# Patient Record
Sex: FEMALE | Race: OTHER | Employment: UNEMPLOYED | URBAN - METROPOLITAN AREA
[De-identification: names, ages, dates, MRNs, and addresses within clinical notes are randomized per-mention and may not be internally consistent; named-entity substitution may affect disease eponyms.]

---

## 2018-05-30 ENCOUNTER — APPOINTMENT (OUTPATIENT)
Dept: CT IMAGING | Age: 33
End: 2018-05-30

## 2018-05-30 ENCOUNTER — HOSPITAL ENCOUNTER (EMERGENCY)
Age: 33
Discharge: HOME | End: 2018-05-31
Attending: EMERGENCY MEDICINE

## 2018-05-30 ENCOUNTER — APPOINTMENT (OUTPATIENT)
Dept: GENERAL RADIOLOGY | Age: 33
End: 2018-05-30

## 2018-05-30 DIAGNOSIS — H81.12 BENIGN PAROXYSMAL POSITIONAL VERTIGO OF LEFT EAR: ICD-10-CM

## 2018-05-30 DIAGNOSIS — R20.0 NUMBNESS: ICD-10-CM

## 2018-05-30 DIAGNOSIS — R42 DIZZINESS: Primary | ICD-10-CM

## 2018-05-30 LAB
ABSOLUTE EOS #: 0.1 K/UL (ref 0–0.4)
ABSOLUTE IMMATURE GRANULOCYTE: ABNORMAL K/UL (ref 0–0.3)
ABSOLUTE LYMPH #: 3.7 K/UL (ref 1–4.8)
ABSOLUTE MONO #: 1 K/UL (ref 0.1–1.2)
ANION GAP SERPL CALCULATED.3IONS-SCNC: 13 MMOL/L (ref 9–17)
BASOPHILS # BLD: 1 % (ref 0–2)
BASOPHILS ABSOLUTE: 0.1 K/UL (ref 0–0.2)
BUN BLDV-MCNC: 10 MG/DL (ref 6–20)
BUN/CREAT BLD: NORMAL (ref 9–20)
CALCIUM SERPL-MCNC: 9.4 MG/DL (ref 8.6–10.4)
CHLORIDE BLD-SCNC: 103 MMOL/L (ref 98–107)
CO2: 25 MMOL/L (ref 20–31)
CREAT SERPL-MCNC: 0.6 MG/DL (ref 0.5–0.9)
DIFFERENTIAL TYPE: ABNORMAL
EKG ATRIAL RATE: 78 BPM
EKG P AXIS: 67 DEGREES
EKG P-R INTERVAL: 154 MS
EKG Q-T INTERVAL: 360 MS
EKG QRS DURATION: 76 MS
EKG QTC CALCULATION (BAZETT): 410 MS
EKG R AXIS: 77 DEGREES
EKG T AXIS: 38 DEGREES
EKG VENTRICULAR RATE: 78 BPM
EOSINOPHILS RELATIVE PERCENT: 1 % (ref 1–4)
GFR AFRICAN AMERICAN: >60 ML/MIN
GFR NON-AFRICAN AMERICAN: >60 ML/MIN
GFR SERPL CREATININE-BSD FRML MDRD: NORMAL ML/MIN/{1.73_M2}
GFR SERPL CREATININE-BSD FRML MDRD: NORMAL ML/MIN/{1.73_M2}
GLUCOSE BLD-MCNC: 94 MG/DL (ref 70–99)
HCT VFR BLD CALC: 37.3 % (ref 36–46)
HEMOGLOBIN: 12.8 G/DL (ref 12–16)
IMMATURE GRANULOCYTES: ABNORMAL %
LYMPHOCYTES # BLD: 26 % (ref 24–44)
MCH RBC QN AUTO: 31.2 PG (ref 26–34)
MCHC RBC AUTO-ENTMCNC: 34.4 G/DL (ref 31–37)
MCV RBC AUTO: 90.8 FL (ref 80–100)
MONOCYTES # BLD: 7 % (ref 2–11)
NRBC AUTOMATED: ABNORMAL PER 100 WBC
PDW BLD-RTO: 12.5 % (ref 12.5–15.4)
PLATELET # BLD: 217 K/UL (ref 140–450)
PLATELET ESTIMATE: ABNORMAL
PMV BLD AUTO: 9.2 FL (ref 6–12)
POTASSIUM SERPL-SCNC: 3.8 MMOL/L (ref 3.7–5.3)
RBC # BLD: 4.11 M/UL (ref 4–5.2)
RBC # BLD: ABNORMAL 10*6/UL
SEG NEUTROPHILS: 65 % (ref 36–66)
SEGMENTED NEUTROPHILS ABSOLUTE COUNT: 9.5 K/UL (ref 1.8–7.7)
SODIUM BLD-SCNC: 141 MMOL/L (ref 135–144)
TROPONIN INTERP: NORMAL
TROPONIN T: <0.03 NG/ML
WBC # BLD: 14.4 K/UL (ref 3.5–11)
WBC # BLD: ABNORMAL 10*3/UL

## 2018-05-30 PROCEDURE — 93005 ELECTROCARDIOGRAM TRACING: CPT

## 2018-05-30 PROCEDURE — 85025 COMPLETE CBC W/AUTO DIFF WBC: CPT

## 2018-05-30 PROCEDURE — 70450 CT HEAD/BRAIN W/O DYE: CPT

## 2018-05-30 PROCEDURE — 6370000000 HC RX 637 (ALT 250 FOR IP): Performed by: EMERGENCY MEDICINE

## 2018-05-30 PROCEDURE — 84484 ASSAY OF TROPONIN QUANT: CPT

## 2018-05-30 PROCEDURE — 99284 EMERGENCY DEPT VISIT MOD MDM: CPT

## 2018-05-30 PROCEDURE — 71045 X-RAY EXAM CHEST 1 VIEW: CPT

## 2018-05-30 PROCEDURE — 36415 COLL VENOUS BLD VENIPUNCTURE: CPT

## 2018-05-30 PROCEDURE — 80048 BASIC METABOLIC PNL TOTAL CA: CPT

## 2018-05-30 RX ORDER — MECLIZINE HCL 12.5 MG/1
25 TABLET ORAL ONCE
Status: COMPLETED | OUTPATIENT
Start: 2018-05-30 | End: 2018-05-30

## 2018-05-30 RX ADMIN — MECLIZINE HCL 25 MG: 12.5 TABLET ORAL at 23:35

## 2018-05-30 ASSESSMENT — PAIN SCALES - GENERAL: PAINLEVEL_OUTOF10: 2

## 2018-05-30 ASSESSMENT — PAIN DESCRIPTION - DESCRIPTORS: DESCRIPTORS: HEADACHE

## 2018-05-30 ASSESSMENT — PAIN DESCRIPTION - PAIN TYPE: TYPE: ACUTE PAIN

## 2018-05-30 ASSESSMENT — PAIN DESCRIPTION - LOCATION: LOCATION: HEAD

## 2018-05-31 VITALS
TEMPERATURE: 98.2 F | DIASTOLIC BLOOD PRESSURE: 63 MMHG | BODY MASS INDEX: 23 KG/M2 | OXYGEN SATURATION: 97 % | HEART RATE: 85 BPM | HEIGHT: 62 IN | RESPIRATION RATE: 18 BRPM | WEIGHT: 125 LBS | SYSTOLIC BLOOD PRESSURE: 109 MMHG

## 2018-05-31 RX ORDER — MECLIZINE HYDROCHLORIDE 25 MG/1
25 TABLET ORAL 3 TIMES DAILY PRN
Qty: 30 TABLET | Refills: 0 | Status: SHIPPED | OUTPATIENT
Start: 2018-05-31 | End: 2018-06-10

## 2018-09-03 ENCOUNTER — HOSPITAL ENCOUNTER (EMERGENCY)
Age: 33
Discharge: HOME | End: 2018-09-03
Attending: SPECIALIST
Payer: MEDICAID

## 2018-09-03 VITALS
WEIGHT: 145 LBS | BODY MASS INDEX: 25.69 KG/M2 | TEMPERATURE: 98.6 F | SYSTOLIC BLOOD PRESSURE: 110 MMHG | HEIGHT: 63 IN | DIASTOLIC BLOOD PRESSURE: 68 MMHG | HEART RATE: 71 BPM | OXYGEN SATURATION: 98 %

## 2018-09-03 DIAGNOSIS — J30.2 SEASONAL ALLERGIC RHINITIS, UNSPECIFIED TRIGGER: Primary | ICD-10-CM

## 2018-09-03 PROCEDURE — 6360000002 HC RX W HCPCS: Performed by: SPECIALIST

## 2018-09-03 PROCEDURE — 96374 THER/PROPH/DIAG INJ IV PUSH: CPT

## 2018-09-03 PROCEDURE — 99282 EMERGENCY DEPT VISIT SF MDM: CPT

## 2018-09-03 RX ORDER — PREDNISONE 20 MG/1
20 TABLET ORAL 2 TIMES DAILY
Qty: 10 TABLET | Refills: 0 | Status: SHIPPED | OUTPATIENT
Start: 2018-09-03 | End: 2018-09-08

## 2018-09-03 RX ORDER — METHYLPREDNISOLONE SODIUM SUCCINATE 125 MG/2ML
125 INJECTION, POWDER, LYOPHILIZED, FOR SOLUTION INTRAMUSCULAR; INTRAVENOUS ONCE
Status: COMPLETED | OUTPATIENT
Start: 2018-09-03 | End: 2018-09-03

## 2018-09-03 RX ADMIN — METHYLPREDNISOLONE SODIUM SUCCINATE 125 MG: 125 INJECTION, POWDER, FOR SOLUTION INTRAMUSCULAR; INTRAVENOUS at 19:59

## 2018-09-03 ASSESSMENT — ENCOUNTER SYMPTOMS
SORE THROAT: 0
RHINORRHEA: 1
ABDOMINAL PAIN: 0
WHEEZING: 0
VOMITING: 0
SINUS PAIN: 0
DIARRHEA: 0
COUGH: 0
TROUBLE SWALLOWING: 0
SHORTNESS OF BREATH: 0

## 2018-09-26 ENCOUNTER — APPOINTMENT (OUTPATIENT)
Dept: GENERAL RADIOLOGY | Age: 33
End: 2018-09-26
Payer: MEDICAID

## 2018-09-26 ENCOUNTER — HOSPITAL ENCOUNTER (EMERGENCY)
Age: 33
Discharge: HOME | End: 2018-09-26
Attending: EMERGENCY MEDICINE
Payer: MEDICAID

## 2018-09-26 VITALS
HEART RATE: 93 BPM | DIASTOLIC BLOOD PRESSURE: 69 MMHG | TEMPERATURE: 98.7 F | RESPIRATION RATE: 16 BRPM | OXYGEN SATURATION: 98 % | SYSTOLIC BLOOD PRESSURE: 101 MMHG | BODY MASS INDEX: 24.98 KG/M2 | WEIGHT: 141 LBS | HEIGHT: 63 IN

## 2018-09-26 DIAGNOSIS — J18.9 PNEUMONIA DUE TO ORGANISM: Primary | ICD-10-CM

## 2018-09-26 PROCEDURE — 99283 EMERGENCY DEPT VISIT LOW MDM: CPT

## 2018-09-26 PROCEDURE — 71046 X-RAY EXAM CHEST 2 VIEWS: CPT

## 2018-09-26 RX ORDER — GUAIFENESIN 600 MG/1
600 TABLET, EXTENDED RELEASE ORAL 2 TIMES DAILY
Qty: 20 TABLET | Refills: 0 | Status: SHIPPED | OUTPATIENT
Start: 2018-09-26 | End: 2018-10-08 | Stop reason: ALTCHOICE

## 2018-09-26 RX ORDER — BENZONATATE 100 MG/1
100 CAPSULE ORAL 3 TIMES DAILY PRN
Qty: 30 CAPSULE | Refills: 0 | Status: SHIPPED | OUTPATIENT
Start: 2018-09-26 | End: 2018-10-03

## 2018-09-26 RX ORDER — AZITHROMYCIN 250 MG/1
TABLET, FILM COATED ORAL
Qty: 6 TABLET | Refills: 0 | Status: SHIPPED | OUTPATIENT
Start: 2018-09-26 | End: 2018-10-08 | Stop reason: ALTCHOICE

## 2018-09-26 ASSESSMENT — PAIN SCALES - GENERAL: PAINLEVEL_OUTOF10: 8

## 2018-09-26 ASSESSMENT — PAIN DESCRIPTION - FREQUENCY: FREQUENCY: CONTINUOUS

## 2018-09-26 ASSESSMENT — PAIN DESCRIPTION - PAIN TYPE: TYPE: ACUTE PAIN

## 2018-09-26 ASSESSMENT — PAIN DESCRIPTION - LOCATION: LOCATION: GENERALIZED

## 2018-09-26 ASSESSMENT — PAIN DESCRIPTION - DESCRIPTORS: DESCRIPTORS: ACHING

## 2018-09-26 NOTE — ED PROVIDER NOTES
[address]  eMERGENCY dEPARTMENT eNCOUnter      Pt Name: Brian Oh  MRN: [id number]  [place] 1985  Date of evaluation: 9/26/2018      CHIEF COMPLAINT       Chief Complaint   Patient presents with    Cough    Generalized Body Aches    Chills         HISTORY OF PRESENT ILLNESS      The patient presents to the emergency department with chills, cough, and body aches. She also has had runny nose and sore throat. She says this is the fifth day of her illness. She does not have a history of breathing problems but has been coughing up a yellow sputum. She has had a decreased appetite but no vomiting or diarrhea. She has not had a rash. She's been taking TheraFlu and iburofen. REVIEW OF SYSTEMS       All systems reviewed and negative unless noted in HPI. The patient reports chills. Denies vision change. Recent sore throat and rhinorrhea. Denies any neck pain or stiffness. Productive cough without pain. No hemoptysis. No nausea,  vomiting or diarrhea. Decreased appetite. Denies any dysuria. Denies urinary frequency or hematuria. Denies musculoskeletal injury or pain. Denies any weakness, numbness or focal neurologic deficit. Denies any skin rash or edema. No recent psychiatric issues. No easy bruising or bleeding. Denies any polyuria, polydypsia or history of immunocompromise. PAST MEDICAL HISTORY    has a past medical history of Depression. SURGICAL HISTORY      has no past surgical history on file. CURRENT MEDICATIONS       Previous Medications    No medications on file       ALLERGIES     is allergic to seasonal.    FAMILY HISTORY     has no family status information on file. family history is not on file. SOCIAL HISTORY      reports that she has never smoked. She has never used smokeless tobacco. She reports that she does not drink alcohol or use drugs.     PHYSICAL EXAM     INITIAL VITALS:  height is 5' 3\" (1.6 m) and weight is

## 2018-10-08 ENCOUNTER — OFFICE VISIT (OUTPATIENT)
Dept: PRIMARY CARE CLINIC | Age: 33
End: 2018-10-08
Payer: MEDICAID

## 2018-10-08 VITALS
DIASTOLIC BLOOD PRESSURE: 62 MMHG | BODY MASS INDEX: 25.52 KG/M2 | HEIGHT: 63 IN | OXYGEN SATURATION: 96 % | SYSTOLIC BLOOD PRESSURE: 86 MMHG | HEART RATE: 88 BPM | WEIGHT: 144 LBS

## 2018-10-08 DIAGNOSIS — M54.6 ACUTE MIDLINE THORACIC BACK PAIN: Primary | ICD-10-CM

## 2018-10-08 DIAGNOSIS — Z87.891 QUIT SMOKING WITHIN PAST YEAR: ICD-10-CM

## 2018-10-08 DIAGNOSIS — J13 PNEUMONIA OF LEFT LOWER LOBE DUE TO STREPTOCOCCUS PNEUMONIAE (HCC): ICD-10-CM

## 2018-10-08 DIAGNOSIS — N39.3 STRESS INCONTINENCE, FEMALE: ICD-10-CM

## 2018-10-08 DIAGNOSIS — R63.5 WEIGHT GAIN: ICD-10-CM

## 2018-10-08 DIAGNOSIS — Z82.3 FAMILY HISTORY OF STROKE: ICD-10-CM

## 2018-10-08 DIAGNOSIS — Z11.4 SCREENING FOR HIV (HUMAN IMMUNODEFICIENCY VIRUS): ICD-10-CM

## 2018-10-08 DIAGNOSIS — R53.83 FATIGUE, UNSPECIFIED TYPE: ICD-10-CM

## 2018-10-08 PROCEDURE — 99204 OFFICE O/P NEW MOD 45 MIN: CPT | Performed by: INTERNAL MEDICINE

## 2018-10-08 RX ORDER — GUAIFENESIN/DEXTROMETHORPHAN 100-10MG/5
5 SYRUP ORAL 3 TIMES DAILY PRN
Qty: 236 ML | Refills: 0 | Status: SHIPPED | OUTPATIENT
Start: 2018-10-08 | End: 2018-10-18

## 2018-10-08 RX ORDER — ALBUTEROL SULFATE 90 UG/1
2 AEROSOL, METERED RESPIRATORY (INHALATION) EVERY 6 HOURS PRN
Qty: 1 INHALER | Refills: 0 | Status: SHIPPED | OUTPATIENT
Start: 2018-10-08 | End: 2019-04-07 | Stop reason: ALTCHOICE

## 2018-10-08 ASSESSMENT — ENCOUNTER SYMPTOMS
VOMITING: 0
DIARRHEA: 0
RHINORRHEA: 0
APNEA: 0
SINUS PRESSURE: 0
RECTAL PAIN: 0
CHEST TIGHTNESS: 1
ABDOMINAL DISTENTION: 0
SHORTNESS OF BREATH: 1
COUGH: 1
BACK PAIN: 1
NAUSEA: 0
ABDOMINAL PAIN: 0
SINUS PAIN: 0
WHEEZING: 0
SORE THROAT: 0

## 2018-10-08 ASSESSMENT — PATIENT HEALTH QUESTIONNAIRE - PHQ9
SUM OF ALL RESPONSES TO PHQ9 QUESTIONS 1 & 2: 1
SUM OF ALL RESPONSES TO PHQ QUESTIONS 1-9: 1
1. LITTLE INTEREST OR PLEASURE IN DOING THINGS: 0
2. FEELING DOWN, DEPRESSED OR HOPELESS: 1
SUM OF ALL RESPONSES TO PHQ QUESTIONS 1-9: 1

## 2018-10-08 NOTE — PROGRESS NOTES
[address] PRIMARY CARE  [address] [place] [address]   [address]  [place] [address]  Dept: [phone]  Dept Fax: [phone]    Aparna Wagner is a 26 y.o. female who presents today for her medical conditions/complaints as noted below. Chief Complaint   Patient presents with   [address] from [facility] pneumonia    Cough       HPI:     This is a relatively healthy 64-year-old female who is here to establish care. She [place] moved from [place] after her divorce. She has a 13year-old. She has not had any panic medical problems and has not taken any medications in the past.  She was recently seen in the ER for pneumonia and was treated with antibiotics. She reports that she is feeling much better now but she has this Lamenting cough which is still lingering. She does not have any fever or chills like she had before. X-ray was reviewed along with the patient. She has quit smoking 6 months ago and before that she used to smoke half a pack a day for about 10 years. She reports that she has been having urinary leaking and accidents with coughing. This has been going on since she had the pneumonia. She complains of this midline thoracic back pain which is tender on palpation which is started about a month ago and is exacerbated with movement. She is concerned and wants to get that imaged. She complains of fatigue for past couple of months and she has gained about 25 pounds in the last 6 months. This was unintentional.  She reports that she had anemia before and had to take iron supplements while she was at [place] [facility]         No results found for: LABA1C          ( goal A1C is < 7)   No results found for: LABMICR  No results found for: LDLCHOLESTEROL, LDLCALC    (goal LDL is <100)   BUN (mg/dL)   Date Value   05/30/2018 10     BP Readings from Last 3 Encounters:   10/08/18 86/62   09/26/18 101/69   09/03/18 110/68          (goal patient instructions. Discussed use, benefit, and side effects of prescribed medications. All patient questions answered. Pt voiced understanding. Reviewed health maintenance. Instructed to continue current medications, diet and exercise. Patient agreed with treatment plan. Follow up as directed. Electronically signed by Mandeep Lomeli MD on 10/8/2018 at 3:41 PM      Please note that this chart was generated using voice recognition Dragon dictation software. Although every effort was made to ensure the accuracy of this automated transcription, some errors in transcription may have occurred.

## 2018-10-09 ENCOUNTER — HOSPITAL ENCOUNTER (OUTPATIENT)
Dept: GENERAL RADIOLOGY | Age: 33
Discharge: HOME | End: 2018-10-11
Payer: MEDICAID

## 2018-10-09 ENCOUNTER — HOSPITAL ENCOUNTER (OUTPATIENT)
Age: 33
Discharge: HOME | End: 2018-10-09
Payer: MEDICAID

## 2018-10-09 ENCOUNTER — HOSPITAL ENCOUNTER (OUTPATIENT)
Age: 33
Discharge: HOME | End: 2018-10-11
Payer: MEDICAID

## 2018-10-09 DIAGNOSIS — R53.83 FATIGUE, UNSPECIFIED TYPE: ICD-10-CM

## 2018-10-09 DIAGNOSIS — R63.5 WEIGHT GAIN: ICD-10-CM

## 2018-10-09 DIAGNOSIS — M54.6 ACUTE MIDLINE THORACIC BACK PAIN: ICD-10-CM

## 2018-10-09 DIAGNOSIS — Z82.3 FAMILY HISTORY OF STROKE: ICD-10-CM

## 2018-10-09 DIAGNOSIS — Z11.4 SCREENING FOR HIV (HUMAN IMMUNODEFICIENCY VIRUS): ICD-10-CM

## 2018-10-09 LAB
ABSOLUTE EOS #: 0.17 K/UL (ref 0–0.44)
ABSOLUTE IMMATURE GRANULOCYTE: 0.03 K/UL (ref 0–0.3)
ABSOLUTE LYMPH #: 2.59 K/UL (ref 1.1–3.7)
ABSOLUTE MONO #: 0.7 K/UL (ref 0.1–1.2)
BASOPHILS # BLD: 1 % (ref 0–2)
BASOPHILS ABSOLUTE: 0.08 K/UL (ref 0–0.2)
CHOLESTEROL/HDL RATIO: 2.7
CHOLESTEROL: 135 MG/DL
DIFFERENTIAL TYPE: ABNORMAL
EOSINOPHILS RELATIVE PERCENT: 2 % (ref 1–4)
HCT VFR BLD CALC: 36.7 % (ref 36.3–47.1)
HDLC SERPL-MCNC: 50 MG/DL
HEMOGLOBIN: 12.4 G/DL (ref 11.9–15.1)
HIV AG/AB: NONREACTIVE
IMMATURE GRANULOCYTES: 0 %
LDL CHOLESTEROL: 74 MG/DL (ref 0–130)
LYMPHOCYTES # BLD: 30 % (ref 24–43)
MCH RBC QN AUTO: 30 PG (ref 25.2–33.5)
MCHC RBC AUTO-ENTMCNC: 33.8 G/DL (ref 28.4–34.8)
MCV RBC AUTO: 88.9 FL (ref 82.6–102.9)
MONOCYTES # BLD: 8 % (ref 3–12)
NRBC AUTOMATED: 0 PER 100 WBC
PDW BLD-RTO: 11.6 % (ref 11.8–14.4)
PLATELET # BLD: 230 K/UL (ref 138–453)
PLATELET ESTIMATE: ABNORMAL
PMV BLD AUTO: 11 FL (ref 8.1–13.5)
RBC # BLD: 4.13 M/UL (ref 3.95–5.11)
RBC # BLD: ABNORMAL 10*6/UL
SEG NEUTROPHILS: 59 % (ref 36–65)
SEGMENTED NEUTROPHILS ABSOLUTE COUNT: 5.18 K/UL (ref 1.5–8.1)
TRIGL SERPL-MCNC: 56 MG/DL
TSH SERPL DL<=0.05 MIU/L-ACNC: 2.7 MIU/L (ref 0.3–5)
VLDLC SERPL CALC-MCNC: NORMAL MG/DL (ref 1–30)
WBC # BLD: 8.8 K/UL (ref 3.5–11.3)
WBC # BLD: ABNORMAL 10*3/UL

## 2018-10-09 PROCEDURE — 87389 HIV-1 AG W/HIV-1&-2 AB AG IA: CPT

## 2018-10-09 PROCEDURE — 80061 LIPID PANEL: CPT

## 2018-10-09 PROCEDURE — 85025 COMPLETE CBC W/AUTO DIFF WBC: CPT

## 2018-10-09 PROCEDURE — 72070 X-RAY EXAM THORAC SPINE 2VWS: CPT

## 2018-10-09 PROCEDURE — 36415 COLL VENOUS BLD VENIPUNCTURE: CPT

## 2018-10-09 PROCEDURE — 84443 ASSAY THYROID STIM HORMONE: CPT

## 2018-10-22 ENCOUNTER — HOSPITAL ENCOUNTER (OUTPATIENT)
Age: 33
Setting detail: SPECIMEN
Discharge: HOME | End: 2018-10-22
Payer: MEDICAID

## 2018-10-22 ENCOUNTER — OFFICE VISIT (OUTPATIENT)
Dept: PRIMARY CARE CLINIC | Age: 33
End: 2018-10-22
Payer: MEDICAID

## 2018-10-22 VITALS
RESPIRATION RATE: 12 BRPM | DIASTOLIC BLOOD PRESSURE: 68 MMHG | OXYGEN SATURATION: 97 % | BODY MASS INDEX: 25.09 KG/M2 | SYSTOLIC BLOOD PRESSURE: 110 MMHG | HEIGHT: 63 IN | HEART RATE: 93 BPM | WEIGHT: 141.6 LBS

## 2018-10-22 DIAGNOSIS — Z23 NEED FOR VACCINATION: ICD-10-CM

## 2018-10-22 DIAGNOSIS — Z01.419 WELL WOMAN EXAM: Primary | ICD-10-CM

## 2018-10-22 PROCEDURE — 90715 TDAP VACCINE 7 YRS/> IM: CPT | Performed by: INTERNAL MEDICINE

## 2018-10-22 PROCEDURE — 90471 IMMUNIZATION ADMIN: CPT | Performed by: INTERNAL MEDICINE

## 2018-10-22 ASSESSMENT — ENCOUNTER SYMPTOMS
ABDOMINAL DISTENTION: 0
APNEA: 0
SHORTNESS OF BREATH: 0
SINUS PAIN: 0
WHEEZING: 0
COUGH: 1
SINUS PRESSURE: 0

## 2018-11-06 LAB — CYTOLOGY REPORT: NORMAL

## 2019-01-09 ENCOUNTER — OFFICE VISIT (OUTPATIENT)
Dept: PRIMARY CARE CLINIC | Age: 34
End: 2019-01-09
Payer: MEDICAID

## 2019-01-09 VITALS
WEIGHT: 152.4 LBS | DIASTOLIC BLOOD PRESSURE: 68 MMHG | BODY MASS INDEX: 28.05 KG/M2 | HEART RATE: 73 BPM | SYSTOLIC BLOOD PRESSURE: 106 MMHG | HEIGHT: 62 IN | OXYGEN SATURATION: 99 %

## 2019-01-09 DIAGNOSIS — E03.8 SUBCLINICAL HYPOTHYROIDISM: Primary | ICD-10-CM

## 2019-01-09 DIAGNOSIS — M54.6 CHRONIC MIDLINE THORACIC BACK PAIN: ICD-10-CM

## 2019-01-09 DIAGNOSIS — R53.83 FATIGUE, UNSPECIFIED TYPE: ICD-10-CM

## 2019-01-09 DIAGNOSIS — G89.29 CHRONIC MIDLINE THORACIC BACK PAIN: ICD-10-CM

## 2019-01-09 PROCEDURE — G8484 FLU IMMUNIZE NO ADMIN: HCPCS | Performed by: INTERNAL MEDICINE

## 2019-01-09 PROCEDURE — G8427 DOCREV CUR MEDS BY ELIG CLIN: HCPCS | Performed by: INTERNAL MEDICINE

## 2019-01-09 PROCEDURE — 1036F TOBACCO NON-USER: CPT | Performed by: INTERNAL MEDICINE

## 2019-01-09 PROCEDURE — G8419 CALC BMI OUT NRM PARAM NOF/U: HCPCS | Performed by: INTERNAL MEDICINE

## 2019-01-09 PROCEDURE — 99213 OFFICE O/P EST LOW 20 MIN: CPT | Performed by: INTERNAL MEDICINE

## 2019-01-09 RX ORDER — LEVOTHYROXINE SODIUM 0.03 MG/1
25 TABLET ORAL DAILY
Qty: 30 TABLET | Refills: 2 | Status: SHIPPED | OUTPATIENT
Start: 2019-01-09 | End: 2019-04-07 | Stop reason: SDUPTHER

## 2019-01-09 ASSESSMENT — PATIENT HEALTH QUESTIONNAIRE - PHQ9
2. FEELING DOWN, DEPRESSED OR HOPELESS: 0
SUM OF ALL RESPONSES TO PHQ9 QUESTIONS 1 & 2: 0
SUM OF ALL RESPONSES TO PHQ QUESTIONS 1-9: 0
1. LITTLE INTEREST OR PLEASURE IN DOING THINGS: 0
SUM OF ALL RESPONSES TO PHQ QUESTIONS 1-9: 0

## 2019-01-10 ASSESSMENT — ENCOUNTER SYMPTOMS
ABDOMINAL DISTENTION: 0
SINUS PAIN: 0
SHORTNESS OF BREATH: 0
BACK PAIN: 0
DIARRHEA: 0
VOMITING: 0
CONSTIPATION: 1
SINUS PRESSURE: 0
ABDOMINAL PAIN: 0
WHEEZING: 0
COUGH: 0
NAUSEA: 0

## 2019-01-11 ENCOUNTER — HOSPITAL ENCOUNTER (EMERGENCY)
Age: 34
Discharge: HOME | End: 2019-01-11
Attending: EMERGENCY MEDICINE
Payer: MEDICAID

## 2019-01-11 ENCOUNTER — APPOINTMENT (OUTPATIENT)
Dept: GENERAL RADIOLOGY | Age: 34
End: 2019-01-11
Payer: MEDICAID

## 2019-01-11 VITALS
RESPIRATION RATE: 12 BRPM | OXYGEN SATURATION: 97 % | BODY MASS INDEX: 27.97 KG/M2 | SYSTOLIC BLOOD PRESSURE: 101 MMHG | TEMPERATURE: 98.1 F | DIASTOLIC BLOOD PRESSURE: 67 MMHG | HEIGHT: 62 IN | WEIGHT: 152 LBS | HEART RATE: 79 BPM

## 2019-01-11 DIAGNOSIS — S92.402A CLOSED FRACTURE OF PHALANX OF LEFT GREAT TOE, PHYSEAL INVOLVEMENT UNSPECIFIED, UNSPECIFIED PHALANX, INITIAL ENCOUNTER: Primary | ICD-10-CM

## 2019-01-11 PROCEDURE — 6370000000 HC RX 637 (ALT 250 FOR IP): Performed by: EMERGENCY MEDICINE

## 2019-01-11 PROCEDURE — 73630 X-RAY EXAM OF FOOT: CPT

## 2019-01-11 PROCEDURE — 99283 EMERGENCY DEPT VISIT LOW MDM: CPT

## 2019-01-11 RX ORDER — NAPROXEN 500 MG/1
500 TABLET ORAL 2 TIMES DAILY
Qty: 10 TABLET | Refills: 0 | Status: SHIPPED | OUTPATIENT
Start: 2019-01-11 | End: 2019-04-07 | Stop reason: ALTCHOICE

## 2019-01-11 RX ORDER — ACETAMINOPHEN 500 MG
1000 TABLET ORAL ONCE
Status: COMPLETED | OUTPATIENT
Start: 2019-01-11 | End: 2019-01-11

## 2019-01-11 RX ADMIN — ACETAMINOPHEN 1000 MG: 500 TABLET ORAL at 16:04

## 2019-01-11 ASSESSMENT — PAIN SCALES - GENERAL
PAINLEVEL_OUTOF10: 8
PAINLEVEL_OUTOF10: 7
PAINLEVEL_OUTOF10: 8

## 2019-01-11 ASSESSMENT — PAIN DESCRIPTION - LOCATION: LOCATION: FOOT

## 2019-01-11 ASSESSMENT — PAIN DESCRIPTION - ORIENTATION: ORIENTATION: LEFT

## 2019-02-28 ENCOUNTER — TELEPHONE (OUTPATIENT)
Dept: PRIMARY CARE CLINIC | Age: 34
End: 2019-02-28

## 2019-04-07 ENCOUNTER — HOSPITAL ENCOUNTER (EMERGENCY)
Age: 34
Discharge: HOME | End: 2019-04-07
Attending: EMERGENCY MEDICINE
Payer: MEDICAID

## 2019-04-07 VITALS
WEIGHT: 145 LBS | RESPIRATION RATE: 20 BRPM | HEIGHT: 63 IN | TEMPERATURE: 97.7 F | HEART RATE: 84 BPM | DIASTOLIC BLOOD PRESSURE: 67 MMHG | SYSTOLIC BLOOD PRESSURE: 119 MMHG | BODY MASS INDEX: 25.69 KG/M2 | OXYGEN SATURATION: 97 %

## 2019-04-07 DIAGNOSIS — E03.8 SUBCLINICAL HYPOTHYROIDISM: ICD-10-CM

## 2019-04-07 DIAGNOSIS — J11.1 INFLUENZA-LIKE ILLNESS: Primary | ICD-10-CM

## 2019-04-07 LAB
DIRECT EXAM: NORMAL
DIRECT EXAM: NORMAL
Lab: NORMAL
Lab: NORMAL
SPECIMEN DESCRIPTION: NORMAL
SPECIMEN DESCRIPTION: NORMAL

## 2019-04-07 PROCEDURE — 87804 INFLUENZA ASSAY W/OPTIC: CPT

## 2019-04-07 PROCEDURE — 99283 EMERGENCY DEPT VISIT LOW MDM: CPT

## 2019-04-07 PROCEDURE — 87880 STREP A ASSAY W/OPTIC: CPT

## 2019-04-07 RX ORDER — OSELTAMIVIR PHOSPHATE 75 MG/1
75 CAPSULE ORAL 2 TIMES DAILY
Qty: 10 CAPSULE | Refills: 0 | Status: SHIPPED | OUTPATIENT
Start: 2019-04-07 | End: 2019-04-12

## 2019-04-07 RX ORDER — IBUPROFEN 400 MG/1
400 TABLET ORAL EVERY 6 HOURS PRN
Qty: 30 TABLET | Refills: 0 | Status: SHIPPED | OUTPATIENT
Start: 2019-04-07

## 2019-04-07 ASSESSMENT — ENCOUNTER SYMPTOMS
SORE THROAT: 1
COUGH: 1
EYE DISCHARGE: 0
NAUSEA: 1
SHORTNESS OF BREATH: 0
EYE REDNESS: 0
DIARRHEA: 0
ABDOMINAL PAIN: 0
VOMITING: 0

## 2019-04-07 ASSESSMENT — PAIN DESCRIPTION - LOCATION
LOCATION: GENERALIZED
LOCATION: GENERALIZED

## 2019-04-07 ASSESSMENT — PAIN SCALES - GENERAL
PAINLEVEL_OUTOF10: 3
PAINLEVEL_OUTOF10: 6

## 2019-04-07 NOTE — ED PROVIDER NOTES
[facility] ED  [address] [place] [address]  Phone: [phone]  Fax: [phone]      Pt Name: Qian Khoury  MRN: [id number]  [place] 1985  Date of evaluation: 4/7/2019      CHIEF COMPLAINT       Chief Complaint   Patient presents with    Fever     tylenol 3 hours ago, onset yesterday    Sore    Abdominal Pain    Generalized Body Aches    Cough       HISTORY OF PRESENT ILLNESS   (Location, Quality, Severity, Duration, Timing, Context, ModifyingFactors, Associated Signs and Symptoms)     Qian Khoury is a 37 y.o. female who presents to the ER for evaluation of fever, headaches, body aches, sore throat and cough. Patient states that her symptoms started yesterday. Patient reports fevers at home; Tmax = 101.3°F.  Patient states that her cough is predominantly dry. She denies difficulty breathing. She has had some mild nausea, but denies abdominal pain, vomiting and diarrhea. Patient states that she has been exposed to her brother, sister-in-law and nephew level tested positive for influenza A. Patient did not receive an influenza vaccine. She has no history of asthma. She states that she took Tylenol for fever and bodyaches around 3 PM this afternoon. She rates her acute discomfort at 6/10. Nursing Notes were reviewed. REVIEW OF SYSTEMS     (2-9 systems for level 4, 10 or more for level 5)    Review of Systems   Constitutional: Positive for fever. Negative for appetite change. HENT: Positive for ear pain and sore throat. Negative for congestion and ear discharge. Eyes: Negative for discharge and redness. Respiratory: Positive for cough. Negative for shortness of breath. Cardiovascular: Negative for chest pain. Gastrointestinal: Positive for nausea. Negative for abdominal pain, diarrhea and vomiting. Genitourinary: Negative for decreased urine volume. Musculoskeletal: Positive for myalgias. Negative for neck stiffness. Skin: Negative for rash. Neurological: Positive for headaches. Negative for seizures and syncope. All other systems reviewed and are negative. PAST MEDICAL HISTORY    has a past medical history of Depression. SURGICAL HISTORY      has no past surgical history on file. CURRENT MEDICATIONS       Discharge Medication List as of 4/7/2019  9:17 PM      CONTINUE these medications which have NOT CHANGED    Details   levothyroxine (SYNTHROID) 25 MCG tablet Take 1 tablet by mouth Daily, Disp-30 tablet, R-2Normal           ALLERGIES     is allergic to seasonal.    FAMILY HISTORY     Not relevant to the current problem. SOCIAL HISTORY      reports that she has quit smoking. She has never used smokeless tobacco. She reports that she does not drink alcohol or use drugs. PHYSICAL EXAM     (7 for level 4, 8 or more for level 5)    ED Triage Vitals [04/07/19 1952]   BP Temp Temp Source Pulse Resp SpO2 Height Weight   119/67 97.7 °F (36.5 °C) Oral 84 20 97 % 5' 3\" (1.6 m) 145 lb (65.8 kg)     Physical Exam   Constitutional: She appears well-developed and well-nourished. No distress. Nontoxic. HENT:   Head: Normocephalic and atraumatic. Right Ear: Tympanic membrane and ear canal normal.   Left Ear: Tympanic membrane and ear canal normal.   Mouth/Throat: Uvula is midline. Mild pharyngeal erythema without exudate; no trismus; normal phonation. Neck: Normal range of motion. Neck supple. No meningeal signs. Cardiovascular: Normal rate, regular rhythm and normal heart sounds. Pulmonary/Chest: Effort normal and breath sounds normal. No respiratory distress. Musculoskeletal:   Normal ambulation. Lymphadenopathy:     She has no cervical adenopathy. Neurological: She is alert. Grossly intact. Skin: Skin is warm and dry. No rash noted. Psychiatric: She has a normal mood and affect. Her behavior is normal.   Vitals reviewed.     DIAGNOSTIC RESULTS     LABS:  Results for orders placed or performed during the hospital most likely suffering from a viral illness. Patient will be prescribed Tamiflu and ibuprofen. Follow-up evaluation with primary care doctor in the next 2-3 days. FINAL IMPRESSION      1.  Influenza-like illness        DISPOSITION/PLAN   DISPOSITION - home     Condition on Disposition  Stable    PATIENT REFERRED TO:  Jonathan Sheikh MD  [place] [address]  [phone]    Schedule an appointment as soon as possible for a visit   For reevaluation in 2-3 days    DISCHARGE MEDICATIONS:  Discharge Medication List as of 4/7/2019  9:17 PM      START taking these medications    Details   oseltamivir (TAMIFLU) 75 MG capsule Take 1 capsule by mouth 2 times daily for 5 days, Disp-10 capsule, R-0Print      ibuprofen (IBU) 400 MG tablet Take 1 tablet by mouth every 6 hours as needed for Pain, Disp-30 tablet, R-0Print           (Please note that portions of this note were completed with a voice recognition program.  Efforts were made to edit the dictations but occasionally words are mis-transcribed.)    Tamiko De PA-C  04/07/19 [id number]

## 2019-04-08 RX ORDER — LEVOTHYROXINE SODIUM 0.03 MG/1
TABLET ORAL
Qty: 30 TABLET | Refills: 2 | Status: SHIPPED | OUTPATIENT
Start: 2019-04-08 | End: 2019-05-01 | Stop reason: SDUPTHER

## 2019-04-08 NOTE — ED PROVIDER NOTES
[facility] ED  [address] [address]  Phone: [phone]  Fax: [phone]      Attending Physician Attestation    I performed a history and physical examination of the patient and discussed management with the mid level provideer. I reviewed the mid level provider's note and agree with the documented findings and plan of care. Any areas of disagreement are noted on the chart. I was personally present for the key portions of any procedures. I have documented in the chart those procedures where I was not present during the key portions. I have reviewed the emergency nurses triage note. I agree with the chief complaint, past medical history, past surgical history, allergies, medications, social and family history as documented unless otherwise noted below. Documentation of the HPI, Physical Exam and Medical Decision Making performed by mid level providers is based on my personal performance of the HPI, PE and MDM. For Physician Assistant/ Nurse Practitioner cases/documentation I have personally evaluated this patient and have completed at least one if not all key elements of the E/M (history, physical exam, and MDM). Additional findings are as noted. CHIEF COMPLAINT       Chief Complaint   Patient presents with    Fever     tylenol 3 hours ago, onset yesterday    Sore    Abdominal Pain    Generalized Body Aches    Cough         PAST MEDICAL HISTORY    has a past medical history of Depression. SURGICAL HISTORY      has no past surgical history on file. CURRENT MEDICATIONS       Discharge Medication List as of 4/7/2019  9:17 PM      CONTINUE these medications which have NOT CHANGED    Details   levothyroxine (SYNTHROID) 25 MCG tablet Take 1 tablet by mouth Daily, Disp-30 tablet, R-2Normal             ALLERGIES     is allergic to seasonal.    FAMILY HISTORY     has no family status information on file. family history is not on file.     SOCIAL HISTORY reports that she has quit smoking. She has never used smokeless tobacco. She reports that she does not drink alcohol or use drugs. DIAGNOSTIC RESULTS     EKG: All EKG's are interpreted by the Emergency Department Physician who either signs or Co-signs this chart in the absence of a cardiologist.      RADIOLOGY:   Non-plain film images such as CT, Ultrasound and MRI are read by the radiologist. Plain radiographic images are visualized and the radiologist interpretations are reviewed as follows: No orders to display       No results found. LABS:  Results for orders placed or performed during the hospital encounter of 04/07/19   Rapid Influenza A/B Antigens   Result Value Ref Range    Specimen Description . NASOPHARYNGEAL SWAB     Special Requests NOT REPORTED     Direct Exam       PRESUMPTIVE NEGATIVE for Influenza A + B antigens. PCR testing to confirm this result is available upon request.  Specimen will be saved in the laboratory for 7 days. Please call [phone] if PCR testing is indicated. Strep Screen Group A Throat   Result Value Ref Range    Specimen Description . THROAT SWAB     Special Requests NOT REPORTED     Direct Exam       Rapid Strep A negative. A negative Rapid Group A Strep Screen result does not rule out the possibility of Group A Streptococci in the specimen. A Group A Strep DNA test is available upon request.         EMERGENCY DEPARTMENT COURSE:   Vitals:    Vitals:    04/07/19 1952   BP: 119/67   Pulse: 84   Resp: 20   Temp: 97.7 °F (36.5 °C)   TempSrc: Oral   SpO2: 97%   Weight: 65.8 kg (145 lb)   Height: 5' 3\" (1.6 m)     -------------------------  BP: 119/67, Temp: 97.7 °F (36.5 °C), Pulse: 84, Resp: 20      PERTINENT ATTENDING PHYSICIAN COMMENTS:    Patient resents with fever and generalized body aches with upper respiratory infection symptoms. Patient tells me the symptoms began 2 days ago but worsened today. Has had positive sick contacts. She has had positive influenza contact. On exam she is nontoxic-appearing. Vital signs are normal.  HEENT exam unremarkable. No neck or meningeal findings on exam.  Abdomen is benign. Clinically she appears well and is otherwise nontoxic. I feel she is out of the window for Tamiflu at this time. She was advised to follow-up with her PCP or return right away for worsening or for new or concerning symptoms. She is comfortable with this plan. I have reviewed the disposition diagnosis with the patient and or their family/guardian. I have answered their questions and given discharge instructions. They voiced understanding of these instructions and did not have any further questions or complaints.        (Please note that portions of this note were completed with a voice recognition program.  Efforts were made to edit the dictations but occasionally words are mis-transcribed.)    Morris Gomez,,   Attending Emergency Medicine Physician       Morris Gomez DO  04/08/19 0871

## 2019-04-25 ENCOUNTER — TELEPHONE (OUTPATIENT)
Dept: PRIMARY CARE CLINIC | Age: 34
End: 2019-04-25

## 2019-04-25 NOTE — TELEPHONE ENCOUNTER
Patient is requesting lab order be put in to get labs prior to her scheduled appointment 55/1/19. Please call patient to advise.

## 2019-05-01 ENCOUNTER — OFFICE VISIT (OUTPATIENT)
Dept: PRIMARY CARE CLINIC | Age: 34
End: 2019-05-01
Payer: MEDICAID

## 2019-05-01 VITALS
BODY MASS INDEX: 27.18 KG/M2 | WEIGHT: 153.4 LBS | SYSTOLIC BLOOD PRESSURE: 98 MMHG | DIASTOLIC BLOOD PRESSURE: 64 MMHG | OXYGEN SATURATION: 98 % | HEART RATE: 77 BPM | HEIGHT: 63 IN

## 2019-05-01 DIAGNOSIS — E66.3 OVERWEIGHT (BMI 25.0-29.9): ICD-10-CM

## 2019-05-01 DIAGNOSIS — E03.8 SUBCLINICAL HYPOTHYROIDISM: Primary | ICD-10-CM

## 2019-05-01 DIAGNOSIS — R10.13 DYSPEPSIA: ICD-10-CM

## 2019-05-01 PROCEDURE — 1036F TOBACCO NON-USER: CPT | Performed by: INTERNAL MEDICINE

## 2019-05-01 PROCEDURE — G8419 CALC BMI OUT NRM PARAM NOF/U: HCPCS | Performed by: INTERNAL MEDICINE

## 2019-05-01 PROCEDURE — 99213 OFFICE O/P EST LOW 20 MIN: CPT | Performed by: INTERNAL MEDICINE

## 2019-05-01 PROCEDURE — G8427 DOCREV CUR MEDS BY ELIG CLIN: HCPCS | Performed by: INTERNAL MEDICINE

## 2019-05-01 RX ORDER — PANTOPRAZOLE SODIUM 40 MG/1
40 TABLET, DELAYED RELEASE ORAL
Qty: 60 TABLET | Refills: 1 | Status: SHIPPED | OUTPATIENT
Start: 2019-05-01 | End: 2019-08-18 | Stop reason: SDUPTHER

## 2019-05-01 RX ORDER — LEVOTHYROXINE SODIUM 0.03 MG/1
TABLET ORAL
Qty: 60 TABLET | Refills: 2 | Status: SHIPPED | OUTPATIENT
Start: 2019-05-01 | End: 2019-09-24

## 2019-05-01 ASSESSMENT — PATIENT HEALTH QUESTIONNAIRE - PHQ9
1. LITTLE INTEREST OR PLEASURE IN DOING THINGS: 0
SUM OF ALL RESPONSES TO PHQ QUESTIONS 1-9: 0
SUM OF ALL RESPONSES TO PHQ9 QUESTIONS 1 & 2: 0
2. FEELING DOWN, DEPRESSED OR HOPELESS: 0
SUM OF ALL RESPONSES TO PHQ QUESTIONS 1-9: 0

## 2019-05-01 NOTE — PATIENT INSTRUCTIONS
Patient Education        pantoprazole (oral/injection)  Pronunciation:  pan TOE pra zole  Brand:  Protonix  What is the most important information I should know about pantoprazole? Call your doctor at once if you have symptoms of a serious side effect: decreased urination, blood in your urine, severe stomach pain, watery or bloody diarrhea, or symptoms of joint pain or a skin rash that worsens in sunlight. You may be more likely to have a broken bone while using pantoprazole. Tell your doctor right away if you have unusual pain in your wrist, hip, or spine. What is pantoprazole? Pantoprazole is a proton pump inhibitor that decreases the amount of acid produced in the stomach. Pantoprazole is used to treat erosive esophagitis (damage to the esophagus from stomach acid caused by gastroesophageal reflux disease, or GERD) in adults and children who are at least 11years old. Pantoprazole is usually given for up to 8 weeks at a time while your esophagus heals. Pantoprazole is also used to treat Zollinger-Shukla syndrome and other conditions involving excess stomach acid. Pantoprazole is not for immediate relief of heartburn. Pantoprazole may also be used for purposes not listed in this medication guide. What should I discuss with my healthcare provider before using pantoprazole? Heartburn can mimic the first symptoms of a heart attack. Get emergency medical help if you have chest pain that spreads to your jaw or shoulder and you feel anxious or light-headed. You should not use this medicine if:  · you also take medicine that contains rilpivirine (Edurant, Jason, Wilbur Cone); or  · you are allergic to pantoprazole or similar medicines (lansoprazole, omeprazole, Nexium, Prevacid, Prilosec, and others). Tell your doctor if you have ever had:  · low levels of magnesium in your blood;  · lupus; or  · osteoporosis or low bone mineral density.   You may be more likely to have a broken bone while using pantoprazole. Talk with your doctor about ways to keep your bones healthy, especially if you are an adult over 48. It is not known whether this medicine will harm an unborn baby. Tell your doctor if you are pregnant or plan to become pregnant. You should not breast-feed while using this medicine. Pantoprazole is not approved for use by anyone younger than 11years old. How should I use pantoprazole? Follow all directions on your prescription label and read all medication guides or instruction sheets. Use the medicine exactly as directed. Use the lowest dose for the shortest amount of time needed to treat your condition. Pantoprazole is taken by mouth (oral) or given as an infusion into a vein (injection). A healthcare provider may teach you how to properly use pantoprazole injection by yourself. Pantoprazole tablets are taken by mouth, with or without food. Pantoprazole oral granules should be taken 30 minutes before a meal.  Do not crush, chew, or break the tablet. Swallow it whole. The oral granules should be mixed with applesauce or apple juice and given either by mouth or through a nasogastric (NG) tube. Read and carefully follow any Instructions for Use provided with your medicine. Ask your doctor or pharmacist if you have any questions. Use this medicine for the full prescribed length of time. Your symptoms may improve before the condition is fully treated. If you use pantoprazole for longer than 3 years, you could develop a vitamin B-12 deficiency. Talk to your doctor about how to manage this condition. Call your doctor if your symptoms do not improve or if they get worse while you are using this medicine. Pantoprazole can cause false results with certain medical tests, including a urine drug screening test. Tell the doctor or laboratory staff that you are using this medicine. Store this medicine at room temperature away from moisture, heat, and light. What happens if I miss a dose?   Use the medicine as soon as you can, but skip the missed dose if it is almost time for your next dose. Do not use two doses at one time. What happens if I overdose? Seek emergency medical attention or call the Poison Help line at 1-794.209.6046. What should I avoid while using pantoprazole? This medicine can cause diarrhea, which may be a sign of a new infection. If you have diarrhea that is watery or bloody, call your doctor. Do not use anti-diarrhea medicine unless your doctor tells you to. What are the possible side effects of pantoprazole? Get emergency medical help if you have signs of an allergic reaction: hives; difficulty breathing; swelling of your face, lips, tongue, or throat. Call your doctor at once if you have:  · severe stomach pain, diarrhea that is watery or bloody;  · new or unusual pain in your wrist, hip, or back;  · bruising or swelling where intravenous pantoprazole was injected;  · new or worsening symptoms of lupus --joint pain or swelling with a skin rash on your cheeks or arms (worsens in sunlight);  · kidney problems --urinating less than usual, blood in your urine, swelling, rapid weight gain;  · symptoms of low magnesium --dizziness, fast heartbeats, feeling jittery, muscle pain or weakness, spasms in your hands and feet, a choking feeling, or a seizure; or  · symptoms of low vitamin B --muscle weakness, irregular heartbeats, feeling light-headed or short of breath, pale skin, tiredness, mood changes, numbness or tingling in your arms and legs. Common side effects may include:  · headache, dizziness;  · stomach pain, gas, nausea, vomiting, diarrhea;  · joint pain; or  · fever, rash, or cold symptoms (most common in children). This is not a complete list of side effects and others may occur. Call your doctor for medical advice about side effects. You may report side effects to FDA at 9-652-PZP-5461. What other drugs will affect pantoprazole?   Tell your doctor about all your other medicines. Some may interact with pantoprazole, especially:  · digoxin;  · methotrexate; or  · a diuretic or \"water pill. \"  This list is not complete. Other drugs may affect pantoprazole, including prescription and over-the-counter medicines, vitamins, and herbal products. Not all possible drug interactions are listed here. Where can I get more information? Your pharmacist can provide more information about pantoprazole. Remember, keep this and all other medicines out of the reach of children, never share your medicines with others, and use this medication only for the indication prescribed. Every effort has been made to ensure that the information provided by [facility] [facility] is accurate, up-to-date, and complete, but no guarantee is made to that effect. Drug information contained herein may be time sensitive. [facility] information has been compiled for use by healthcare practitioners and consumers in the [place] and therefore [facility] does not warrant that uses outside of the [place] are appropriate, unless specifically indicated otherwise. [facility]'s drug information does not endorse drugs, diagnose patients or recommend therapy. [facility]'s drug information is an informational resource designed to assist licensed healthcare practitioners in caring for their patients and/or to serve consumers viewing this service as a supplement to, and not a substitute for, the expertise, skill, knowledge and judgment of healthcare practitioners. The absence of a warning for a given drug or drug combination in no way should be construed to indicate that the drug or drug combination is safe, effective or appropriate for any given patient. [facility] does not assume any responsibility for any aspect of healthcare administered with the aid of information [facility] provides.  The information contained herein is not intended to cover all possible uses, directions, precautions, warnings, drug interactions, allergic reactions, or adverse effects. If you have questions about the drugs you are taking, check with your doctor, nurse or pharmacist.  Copyright 8405-3148 [address]: 18.01. Revision date: 2/15/2018. Care instructions adapted under license by [facility] ([facility]). If you have questions about a medical condition or this instruction, always ask your healthcare professional. [place] [address] any warranty or liability for your use of this information.

## 2019-05-02 ASSESSMENT — ENCOUNTER SYMPTOMS
VOMITING: 0
BACK PAIN: 0
DIARRHEA: 0
SINUS PRESSURE: 0
COUGH: 0
CONSTIPATION: 0
ABDOMINAL PAIN: 0
WHEEZING: 0
SHORTNESS OF BREATH: 0
SINUS PAIN: 0
NAUSEA: 0
ABDOMINAL DISTENTION: 0

## 2019-05-02 NOTE — PROGRESS NOTES
[facility] PRIMARY CARE  [address] [place] [address]   [address]  [place] [address]  Dept: [phone]  Dept Fax: [phone]    Hosea Trocnoso is a 37 y.o. female who presents today for her medical conditions/complaints as noted below. Chief Complaint   Patient presents with    Headache    GI Problem     burning, x2 months    Thyroid Problem     hypo       HPI:     Is a 55-year-old female who is here for complaints of epigastric pain when she tries to eat spicy foods. She is also complaining of fatigue and at times constipation. She has gained about 10 pounds as well in past couple of months. Her blood pressure is at 98/64 and pulse at 77. No results found for: LABA1C          ( goal A1C is < 7)   No results found for: LABMICR  LDL Cholesterol (mg/dL)   Date Value   10/09/2018 74       (goal LDL is <100)   BUN (mg/dL)   Date Value   05/30/2018 10     BP Readings from Last 3 Encounters:   05/01/19 98/64   04/07/19 119/67   01/11/19 101/67          (goal 120/80)    Past Medical History:   Diagnosis Date    Depression       No past surgical history on file. No family history on file. Social History     Tobacco Use    Smoking status: Former Smoker    Smokeless tobacco: Never Used   Substance Use Topics    Alcohol use: No      Current Outpatient Medications   Medication Sig Dispense Refill    levothyroxine (SYNTHROID) 25 MCG tablet TAKE 1 TABLET BY MOUTH EVERY DAY 60 tablet 2    pantoprazole (PROTONIX) 40 MG tablet Take 1 tablet by mouth every morning (before breakfast) 60 tablet 1    ibuprofen (IBU) 400 MG tablet Take 1 tablet by mouth every 6 hours as needed for Pain 30 tablet 0     No current facility-administered medications for this visit.       Allergies   Allergen Reactions    Seasonal        Health Maintenance   Topic Date Due    Pneumococcal 0-64 years Vaccine (1 of 1 - PPSV23) 01/29/1989    Varicella Vaccine (1 of 2 - 13+ 2-dose series) 01/29/1996    Flu vaccine (Season Ended) 09/01/2019    Cervical cancer screen  10/22/2021    DTaP/Tdap/Td vaccine (2 - Td) 10/22/2028    HIV screen  Completed       Subjective:      Review of Systems   Constitutional: Positive for fatigue. Negative for activity change, appetite change, chills and fever. HENT: Negative for congestion, ear pain, hearing loss, nosebleeds, sinus pressure, sinus pain and sneezing. Eyes: Negative for visual disturbance. Respiratory: Negative for cough, shortness of breath and wheezing. Cardiovascular: Negative for chest pain and palpitations. Gastrointestinal: Negative for abdominal distention, abdominal pain, constipation, diarrhea, nausea and vomiting. Endocrine: Negative for cold intolerance, heat intolerance, polydipsia, polyphagia and polyuria. Genitourinary: Negative for difficulty urinating, menstrual problem, vaginal bleeding and vaginal discharge. Musculoskeletal: Negative for back pain and joint swelling. Skin: Negative for rash. Neurological: Negative for numbness and headaches. Psychiatric/Behavioral: Negative for sleep disturbance. The patient is not nervous/anxious. All other systems reviewed and are negative. Objective:     Physical Exam   Constitutional: She is oriented to person, place, and time. Vital signs are normal. She appears well-developed and well-nourished. She is active. No distress. HENT:   Head: Normocephalic and atraumatic. Right Ear: Hearing normal.   Left Ear: Hearing normal.   Mouth/Throat: Uvula is midline, oropharynx is clear and moist and mucous membranes are normal.   Eyes: Pupils are equal, round, and reactive to light. Conjunctivae are normal. No scleral icterus. Neck: Normal range of motion, full passive range of motion without pain and phonation normal. Neck supple. No JVD present. No thyroid mass and no thyromegaly present.    Cardiovascular: Normal rate, regular rhythm, normal heart sounds and intact distal pulses. Exam reveals no decreased pulses. No murmur heard. Pulses:       Carotid pulses are 2+ on the right side, and 2+ on the left side. Radial pulses are 2+ on the right side, and 2+ on the left side. Pulmonary/Chest: Effort normal and breath sounds normal. No accessory muscle usage. No apnea. No respiratory distress. She has no wheezes. She has no rales. Abdominal: Soft. Bowel sounds are normal. She exhibits no distension. There is tenderness (epigastric). Musculoskeletal: Normal range of motion. She exhibits no edema or deformity. Lymphadenopathy:     She has no cervical adenopathy. Neurological: She is alert and oriented to person, place, and time. She displays normal reflexes. Skin: Skin is warm and intact. Capillary refill takes less than 2 seconds. No rash noted. She is not diaphoretic. Psychiatric: She has a normal mood and affect. Her behavior is normal. Cognition and memory are normal.   Nursing note and vitals reviewed. BP 98/64   Pulse 77   Ht 5' 3\" (1.6 m)   Wt 153 lb 6.4 oz (69.6 kg)   LMP 04/08/2019   SpO2 98%   BMI 27.17 kg/m²     Assessment:          1. Subclinical hypothyroidism  - TSH; Future  - T4, Free; Future  - levothyroxine (SYNTHROID) 25 MCG tablet; TAKE 1 TABLET BY MOUTH EVERY DAY  Dispense: 60 tablet; Refill: 2    2. Dyspepsia  - pantoprazole (PROTONIX) 40 MG tablet; Take 1 tablet by mouth every morning (before breakfast)  Dispense: 60 tablet; Refill: 1    3. Overweight (BMI 25.0-29. 9)  Diet and exercise            Diagnosis Orders   1. Subclinical hypothyroidism  TSH    T4, Free    levothyroxine (SYNTHROID) 25 MCG tablet   2. Dyspepsia  pantoprazole (PROTONIX) 40 MG tablet   3. Overweight (BMI 25.0-29. 9)             Plan:      Return in about 4 months (around 9/1/2019) for Routine follow-up.     Orders Placed This Encounter   Procedures    TSH     Standing Status:   Future     Standing Expiration Date:   5/1/2020    T4, Free     Standing Status: Future     Standing Expiration Date:   5/1/2020     Orders Placed This Encounter   Medications    levothyroxine (SYNTHROID) 25 MCG tablet     Sig: TAKE 1 TABLET BY MOUTH EVERY DAY     Dispense:  60 tablet     Refill:  2    pantoprazole (PROTONIX) 40 MG tablet     Sig: Take 1 tablet by mouth every morning (before breakfast)     Dispense:  60 tablet     Refill:  1         Patient given educational materials - see patient instructions. Discussed use, benefit, and side effects of prescribedmedications. All patient questions answered. Pt voiced understanding. Reviewed health maintenance. Instructed to continue current medications, diet and exercise. Patient agreed with treatment plan. Follow up as directed. Of the given duration appointment visit, Dr. Marilee Muir MD  spent at least 50% of the face-to-face time in counseling, explanation of diagnosis, planning of further management, and answering all questions. Electronically signed by Marilee Muir MD on 5/2/2019 at 4:40 PM      Please note that this chart was generated using voice recognition Dragon dictation software. Although every effort was made to ensure the accuracy of this automatedtranscription, some errors in transcription may have occurred.

## 2019-05-29 ENCOUNTER — OFFICE VISIT (OUTPATIENT)
Dept: PRIMARY CARE CLINIC | Age: 34
End: 2019-05-29
Payer: MEDICAID

## 2019-05-29 VITALS
SYSTOLIC BLOOD PRESSURE: 92 MMHG | OXYGEN SATURATION: 98 % | RESPIRATION RATE: 16 BRPM | BODY MASS INDEX: 27.19 KG/M2 | WEIGHT: 153.44 LBS | HEART RATE: 68 BPM | DIASTOLIC BLOOD PRESSURE: 74 MMHG | HEIGHT: 63 IN

## 2019-05-29 DIAGNOSIS — J01.10 ACUTE NON-RECURRENT FRONTAL SINUSITIS: Primary | ICD-10-CM

## 2019-05-29 DIAGNOSIS — R21 RASH: ICD-10-CM

## 2019-05-29 PROCEDURE — 99214 OFFICE O/P EST MOD 30 MIN: CPT | Performed by: FAMILY MEDICINE

## 2019-05-29 PROCEDURE — G8419 CALC BMI OUT NRM PARAM NOF/U: HCPCS | Performed by: FAMILY MEDICINE

## 2019-05-29 PROCEDURE — 1036F TOBACCO NON-USER: CPT | Performed by: FAMILY MEDICINE

## 2019-05-29 PROCEDURE — G8427 DOCREV CUR MEDS BY ELIG CLIN: HCPCS | Performed by: FAMILY MEDICINE

## 2019-05-29 RX ORDER — NAPROXEN 250 MG/1
TABLET ORAL
Refills: 0 | COMMUNITY
Start: 2019-05-21 | End: 2021-02-20 | Stop reason: ALTCHOICE

## 2019-05-29 RX ORDER — TRIAMCINOLONE ACETONIDE 5 MG/G
CREAM TOPICAL
Refills: 0 | COMMUNITY
Start: 2019-05-23 | End: 2019-06-12 | Stop reason: SDUPTHER

## 2019-05-29 RX ORDER — CETIRIZINE HYDROCHLORIDE 10 MG/1
10 TABLET ORAL DAILY
Qty: 30 TABLET | Refills: 5 | Status: SHIPPED | OUTPATIENT
Start: 2019-05-29 | End: 2021-02-20 | Stop reason: ALTCHOICE

## 2019-05-29 RX ORDER — AMOXICILLIN AND CLAVULANATE POTASSIUM 875; 125 MG/1; MG/1
1 TABLET, FILM COATED ORAL 2 TIMES DAILY
Qty: 20 TABLET | Refills: 0 | Status: SHIPPED | OUTPATIENT
Start: 2019-05-29 | End: 2019-06-08

## 2019-05-29 ASSESSMENT — ENCOUNTER SYMPTOMS
COUGH: 0
SHORTNESS OF BREATH: 0
VOMITING: 0
SINUS PRESSURE: 1
SORE THROAT: 0
NAUSEA: 0

## 2019-05-29 NOTE — PROGRESS NOTES
[facility] PRIMARY CARE  [address] [place] [address]   [address]  Christin palumbo [facility] [address]  Dept: [phone]  Dept Fax: [phone]    Qian Mohr is a 37 y.o. female who presents today for her medical conditions/complaints as noted below. Qian Mohr is c/o of  Chief Complaint   Patient presents with    Rash     both arms, comes back every year at spring time    Headache       HPI:     HPI     Every year both arms gets rash that gets itchy. Has hard time sleeping from it. Uses sunscreen spray/ cream. Has not tried hyrdocortisone. States it is on both arms and usually comes during spring time. Has not taken any allergy medication. Has history of headaches , however past 3-4 weeks with frontal sinus headache that feels like pressure. Has been having green nasal drainage for past 3 weeks. Went to er three weeks ago and given shot which she states was for strep throat. Not on any oral abx. Feels head is full when she moves it forward. No results found for: LABA1C          ( goal A1C is < 7)   No results found for: LABMICR  LDL Cholesterol (mg/dL)   Date Value   10/09/2018 74       (goal LDL is <100)   BUN (mg/dL)   Date Value   05/30/2018 10     BP Readings from Last 3 Encounters:   05/29/19 92/74   05/01/19 98/64   04/07/19 119/67          (goal 120/80)    Past Medical History:   Diagnosis Date    Depression       No past surgical history on file. No family history on file.     Social History     Tobacco Use    Smoking status: Former Smoker    Smokeless tobacco: Never Used   Substance Use Topics    Alcohol use: No      Current Outpatient Medications   Medication Sig Dispense Refill    naproxen (NAPROSYN) 250 MG tablet TAKE 1 TABLET BY MOUTH EVERY 8 HOURS AS NEEDED FOR PAIN  0    triamcinolone (ARISTOCORT) 0.5 % cream APPLY TO AFFECTED AREA TWICE A DAY  0    cetirizine (ZYRTEC) 10 MG tablet Take 1 tablet by mouth daily 30 tablet 5    amoxicillin-clavulanate (AUGMENTIN) and dry. She is not diaphoretic. Left upper arm with few raised hives and left fore arm with scabbed area from scratching. R  Arm with few areas of mild erythema   Psychiatric: She has a normal mood and affect. Her behavior is normal.     BP 92/74 (Site: Left Upper Arm, Position: Sitting, Cuff Size: Medium Adult)   Pulse 68   Resp 16   Ht 5' 3\" (1.6 m)   Wt 153 lb 7 oz (69.6 kg)   LMP 04/08/2019   SpO2 98%   Breastfeeding? No   BMI 27.18 kg/m²     Assessment:      1. Acute non-recurrent frontal sinusitis  - recommend augmentin , stay well hydrated as well. 2. Rash  - recommend zyrtec daily and to use hydrocortisone in areas that are itchy. Consider allergy testing in future. Pt also uses sunscreen regularly which she states does help a little, continue to use when outdoors. Plan:      Return in about 2 weeks (around 6/12/2019) for follow up. No orders of the defined types were placed in this encounter.     Orders Placed This Encounter   Medications    cetirizine (ZYRTEC) 10 MG tablet     Sig: Take 1 tablet by mouth daily     Dispense:  30 tablet     Refill:  5    amoxicillin-clavulanate (AUGMENTIN) 875-125 MG per tablet     Sig: Take 1 tablet by mouth 2 times daily for 10 days     Dispense:  20 tablet     Refill:  0            Electronically signed by Lea Mujica DO on 5/29/2019 at 10:01 AM

## 2019-06-12 ENCOUNTER — HOSPITAL ENCOUNTER (OUTPATIENT)
Age: 34
Discharge: HOME | End: 2019-06-12
Payer: MEDICAID

## 2019-06-12 ENCOUNTER — TELEPHONE (OUTPATIENT)
Dept: PRIMARY CARE CLINIC | Age: 34
End: 2019-06-12

## 2019-06-12 ENCOUNTER — OFFICE VISIT (OUTPATIENT)
Dept: PRIMARY CARE CLINIC | Age: 34
End: 2019-06-12
Payer: MEDICAID

## 2019-06-12 VITALS
SYSTOLIC BLOOD PRESSURE: 98 MMHG | HEART RATE: 87 BPM | BODY MASS INDEX: 26.58 KG/M2 | HEIGHT: 63 IN | WEIGHT: 150 LBS | DIASTOLIC BLOOD PRESSURE: 64 MMHG | OXYGEN SATURATION: 98 %

## 2019-06-12 DIAGNOSIS — E03.8 SUBCLINICAL HYPOTHYROIDISM: ICD-10-CM

## 2019-06-12 DIAGNOSIS — R21 RASH: ICD-10-CM

## 2019-06-12 DIAGNOSIS — L60.0 INGROWN TOENAIL OF LEFT FOOT: ICD-10-CM

## 2019-06-12 DIAGNOSIS — J32.9 CHRONIC RECURRENT SINUSITIS: Primary | ICD-10-CM

## 2019-06-12 LAB
THYROXINE, FREE: 1.93 NG/DL (ref 0.93–1.7)
TSH SERPL DL<=0.05 MIU/L-ACNC: 0.17 MIU/L (ref 0.3–5)

## 2019-06-12 PROCEDURE — 1036F TOBACCO NON-USER: CPT | Performed by: INTERNAL MEDICINE

## 2019-06-12 PROCEDURE — G8419 CALC BMI OUT NRM PARAM NOF/U: HCPCS | Performed by: INTERNAL MEDICINE

## 2019-06-12 PROCEDURE — 84443 ASSAY THYROID STIM HORMONE: CPT

## 2019-06-12 PROCEDURE — 36415 COLL VENOUS BLD VENIPUNCTURE: CPT

## 2019-06-12 PROCEDURE — 84439 ASSAY OF FREE THYROXINE: CPT

## 2019-06-12 PROCEDURE — 99214 OFFICE O/P EST MOD 30 MIN: CPT | Performed by: INTERNAL MEDICINE

## 2019-06-12 PROCEDURE — G8427 DOCREV CUR MEDS BY ELIG CLIN: HCPCS | Performed by: INTERNAL MEDICINE

## 2019-06-12 RX ORDER — FLUTICASONE PROPIONATE 50 MCG
1 SPRAY, SUSPENSION (ML) NASAL 2 TIMES DAILY
Qty: 1 BOTTLE | Refills: 1 | Status: SHIPPED | OUTPATIENT
Start: 2019-06-12 | End: 2019-08-07 | Stop reason: SDUPTHER

## 2019-06-12 RX ORDER — LEVOTHYROXINE AND LIOTHYRONINE 9.5; 2.25 UG/1; UG/1
15 TABLET ORAL DAILY
Qty: 30 TABLET | Refills: 3 | Status: SHIPPED | OUTPATIENT
Start: 2019-06-12 | End: 2019-09-23

## 2019-06-12 RX ORDER — TRIAMCINOLONE ACETONIDE 5 MG/G
CREAM TOPICAL
Qty: 454 G | Refills: 1 | Status: SHIPPED | OUTPATIENT
Start: 2019-06-12 | End: 2019-06-13 | Stop reason: ALTCHOICE

## 2019-06-12 ASSESSMENT — ENCOUNTER SYMPTOMS
SINUS PRESSURE: 1
NAUSEA: 0
BACK PAIN: 0
DIARRHEA: 0
ABDOMINAL PAIN: 0
CONSTIPATION: 0
COUGH: 0
WHEEZING: 0
SINUS PAIN: 1
ABDOMINAL DISTENTION: 0
SHORTNESS OF BREATH: 0
VOMITING: 0

## 2019-06-12 NOTE — PROGRESS NOTES
[facility] PRIMARY CARE  [address] [place] [address] Dr  [address]  [place] [address]  Dept: [phone]  Dept Fax: [phone]    Kim Kowalski is a 37 y.o. female who presents today for her medical conditions/complaints as noted below. Chief Complaint   Patient presents with    Follow-up     2 weeks     Allergies    Ingrown Toenail    Rash     on leg        HPI:     This is a 57-year-old female who is here for complaints of allergies. Next and she was treated for acute maxillary sinusitis couple weeks ago and she has been doing well since then but has not had complete relief. She has history of a rash when she gets exposed to sun-at times of fever or chills. She complains of sinus tenderness and pressure and also headaches on and off. No phlegm production but complains of congestion. She complains of ingrown toenail on her left great toe which has been a problem for a few months now. No other complaints. Blood pressure is normal.  extreme temperatures. She takes cetirizine and it helps her. No results found for: LABA1C          ( goal A1C is < 7)   No results found for: LABMICR  LDL Cholesterol (mg/dL)   Date Value   10/09/2018 74       (goal LDL is <100)   BUN (mg/dL)   Date Value   05/30/2018 10     BP Readings from Last 3 Encounters:   06/12/19 98/64   05/29/19 92/74   05/01/19 98/64          (goal 120/80)    Past Medical History:   Diagnosis Date    Depression       No past surgical history on file. No family history on file.     Social History     Tobacco Use    Smoking status: Former Smoker    Smokeless tobacco: Never Used   Substance Use Topics    Alcohol use: No      Current Outpatient Medications   Medication Sig Dispense Refill    triamcinolone (ARISTOCORT) 0.5 % cream APPLY TO AFFECTED AREA TWICE A  g 1    fluticasone (FLONASE) 50 MCG/ACT nasal spray 1 spray by Nasal route 2 times daily 1 Bottle 1    naproxen (NAPROSYN) 250 MG negative. Objective:     Physical Exam   Constitutional: She is oriented to person, place, and time. Vital signs are normal. She appears well-developed and well-nourished. She is active. No distress. HENT:   Head: Normocephalic and atraumatic. Right Ear: Hearing normal.   Left Ear: Hearing normal.   Mouth/Throat: Uvula is midline, oropharynx is clear and moist and mucous membranes are normal.   Eyes: Pupils are equal, round, and reactive to light. Conjunctivae are normal. No scleral icterus. Neck: Normal range of motion, full passive range of motion without pain and phonation normal. Neck supple. No JVD present. No thyroid mass and no thyromegaly present. Cardiovascular: Normal rate, regular rhythm, normal heart sounds and intact distal pulses. Exam reveals no decreased pulses. No murmur heard. Pulses:       Carotid pulses are 2+ on the right side, and 2+ on the left side. Radial pulses are 2+ on the right side, and 2+ on the left side. Pulmonary/Chest: Effort normal and breath sounds normal. No accessory muscle usage. No apnea. No respiratory distress. She has no wheezes. She has no rales. Abdominal: Soft. Bowel sounds are normal. She exhibits no distension. There is no tenderness. Musculoskeletal: Normal range of motion. She exhibits no edema or deformity. Lymphadenopathy:     She has no cervical adenopathy. Neurological: She is alert and oriented to person, place, and time. She displays normal reflexes. Skin: Skin is warm and intact. Capillary refill takes less than 2 seconds. Rash (on sun exposed areas norma hand dorsal aspect) noted. She is not diaphoretic. Psychiatric: She has a normal mood and affect. Her behavior is normal. Cognition and memory are normal.   Nursing note and vitals reviewed.     BP 98/64 (Site: Left Upper Arm, Position: Sitting, Cuff Size: Medium Adult)   Pulse 87   Ht 5' 2.99\" (1.6 m)   Wt 150 lb (68 kg)   SpO2 98%   BMI 26.58 kg/m²     Assessment: 1. Chronic recurrent sinusitis  neti ot sample given   - fluticasone (FLONASE) 50 MCG/ACT nasal spray; 1 spray by Nasal route 2 times daily  Dispense: 1 Bottle; Refill: 1    2. Rash  cetrizine prn   - triamcinolone (ARISTOCORT) 0.5 % cream; APPLY TO AFFECTED AREA TWICE A DAY  Dispense: 454 g; Refill: 1    3. Ingrown toenail  Referral given        Diagnosis Orders   1. Chronic recurrent sinusitis  fluticasone (FLONASE) 50 MCG/ACT nasal spray   2. Rash  triamcinolone (ARISTOCORT) 0.5 % cream   3. Ingrown toenail of left foot  Hi Guillory, JACK, Podiatry, [address]:      Return in about 4 months (around 10/12/2019) for Routine follow-up. Orders Placed This Encounter   Procedures   [facility], JACK, Podiatry, [facility]     Referral Priority:   Routine     Referral Type:   Eval and Treat     Referral Reason:   Specialty Services Required     Referred to Provider:   Nicol Ortiz DPM     Requested Specialty:   Podiatry     Number of Visits Requested:   1     Orders Placed This Encounter   Medications    triamcinolone (ARISTOCORT) 0.5 % cream     Sig: APPLY TO AFFECTED AREA TWICE A DAY     Dispense:  454 g     Refill:  1    fluticasone (FLONASE) 50 MCG/ACT nasal spray     Si spray by Nasal route 2 times daily     Dispense:  1 Bottle     Refill:  1         Patient given educational materials - see patient instructions. Discussed use, benefit, and side effects of prescribedmedications. All patient questions answered. Pt voiced understanding. Reviewed health maintenance. Instructed to continue current medications, diet and exercise. Patient agreed with treatment plan. Follow up as directed. Of the given duration appointment visit, Dr. Sanjay Morales MD  spent at least 50% of the face-to-face time in counseling, explanation of diagnosis, planning of further management, and answering all questions.       Electronically signed by Sanjay Morales MD on 6/12/2019 at 11:56 AM      Please note that this chart was generated using voice recognition Dragon dictation software. Although every effort was made to ensure the accuracy of this automatedtranscription, some errors in transcription may have occurred.

## 2019-06-12 NOTE — TELEPHONE ENCOUNTER
Patient was seen today. Wanted to make sure you had placed a referral for a podiatrist, for her nail issue.     Please call when ready

## 2019-06-12 NOTE — PROGRESS NOTES
Visit Information    Have you changed or started any medications since your last visit including any over-the-counter medicines, vitamins, or herbal medicines? no   Have you stopped taking any of your medications? Is so, why? -  no  Are you having any side effects from any of your medications? - no    Have you seen any other physician or provider since your last visit?  no   Have you had any other diagnostic tests since your last visit?  no   Have you been seen in the emergency room and/or had an admission in a hospital since we last saw you?  yes -for sore throat seen at [facility]   Have you had your routine dental cleaning in the past 6 months?  no     Do you have an active MyChart account? If no, what is the barrier?   Yes    Patient Care Team:  Ricardo Varela MD as PCP - General (Internal Medicine)  Ricardo Varela MD as PCP - [facility] Provider    Medical History Review  Past Medical, Family, and Social History reviewed and does contribute to the patient presenting condition    Health Maintenance   Topic Date Due    Pneumococcal 0-64 years Vaccine (1 of 1 - PPSV23) 01/29/1989    Varicella Vaccine (1 of 2 - 13+ 2-dose series) 01/29/1996    Flu vaccine (Season Ended) 09/01/2019    Cervical cancer screen  10/22/2021    DTaP/Tdap/Td vaccine (2 - Td) 10/22/2028    HIV screen  Completed

## 2019-06-13 ENCOUNTER — TELEPHONE (OUTPATIENT)
Dept: PRIMARY CARE CLINIC | Age: 34
End: 2019-06-13

## 2019-06-13 RX ORDER — DIAPER,BRIEF,INFANT-TODD,DISP
EACH MISCELLANEOUS
Qty: 1 TUBE | Refills: 1 | Status: SHIPPED | OUTPATIENT
Start: 2019-06-13 | End: 2019-06-20

## 2019-06-13 NOTE — TELEPHONE ENCOUNTER
Patient called in stating that the triamcinolone cream is not covered by insurance. Her insurance company told her that an alternative medication needs to be sent but they are unable to give her any names of the medications they would cover. Please advise.

## 2019-06-17 ENCOUNTER — OFFICE VISIT (OUTPATIENT)
Dept: PODIATRY | Age: 34
End: 2019-06-17
Payer: MEDICAID

## 2019-06-17 VITALS — HEIGHT: 63 IN | BODY MASS INDEX: 26.58 KG/M2 | WEIGHT: 150 LBS

## 2019-06-17 DIAGNOSIS — B35.1 ONYCHOMYCOSIS OF TOENAIL: Primary | ICD-10-CM

## 2019-06-17 DIAGNOSIS — M79.675 PAIN IN TOE OF LEFT FOOT: ICD-10-CM

## 2019-06-17 PROCEDURE — G8419 CALC BMI OUT NRM PARAM NOF/U: HCPCS | Performed by: PODIATRIST

## 2019-06-17 PROCEDURE — 99203 OFFICE O/P NEW LOW 30 MIN: CPT | Performed by: PODIATRIST

## 2019-06-17 PROCEDURE — G8427 DOCREV CUR MEDS BY ELIG CLIN: HCPCS | Performed by: PODIATRIST

## 2019-06-17 PROCEDURE — 1036F TOBACCO NON-USER: CPT | Performed by: PODIATRIST

## 2019-06-17 ASSESSMENT — ENCOUNTER SYMPTOMS
COLOR CHANGE: 0
SHORTNESS OF BREATH: 0
DIARRHEA: 0
NAUSEA: 0
BACK PAIN: 0

## 2019-06-17 NOTE — PROGRESS NOTES
Conchita Haile is a 37 y.o. female who presents to the office today with chief complaint of nail problem to the left big toenail. Chief Complaint   Patient presents with    Nail Problem     left hallux    Symptoms began about 6 month(s) ago. Patient complains of injury to the left big toe. Patient states that she broke her left big toe about six months ago and the nail fell off. Patient states that when the new nail grew back it started growing into the skin. Pain is rated 0 out of 10 at it's worst and is described as none. Treatments prior to today's visit include: None. Allergies   Allergen Reactions    Seasonal        Past Medical History:   Diagnosis Date    Depression        Prior to Admission medications    Medication Sig Start Date End Date Taking? Authorizing Provider   hydrocortisone 1 % cream Apply topically 2 -3 times daily for 5 - 7 days. 6/13/19 6/20/19 Yes PEBBLES Cisse CNP   fluticasone (FLONASE) 50 MCG/ACT nasal spray 1 spray by Nasal route 2 times daily 6/12/19  Yes Judie Ruffin MD   thyroid [facility] THYROID) 15 MG tablet Take 1 tablet by mouth daily 6/12/19  Yes Judie Ruffin MD   naproxen (NAPROSYN) 250 MG tablet TAKE 1 TABLET BY MOUTH EVERY 8 HOURS AS NEEDED FOR PAIN 5/21/19  Yes Historical Provider, MD   cetirizine (ZYRTEC) 10 MG tablet Take 1 tablet by mouth daily 5/29/19  Yes Aldo Villatoro DO   levothyroxine (SYNTHROID) 25 MCG tablet TAKE 1 TABLET BY MOUTH EVERY DAY 5/1/19  Yes Judie Ruffin MD   pantoprazole (PROTONIX) 40 MG tablet Take 1 tablet by mouth every morning (before breakfast) 5/1/19  Yes Judie Ruffin MD   ibuprofen (IBU) 400 MG tablet Take 1 tablet by mouth every 6 hours as needed for Pain 4/7/19  Yes Yamileth Mccain PA-C       History reviewed. No pertinent surgical history. History reviewed. No pertinent family history.     Social History     Tobacco Use    Smoking status: Former Smoker    Smokeless tobacco: Never Used   Substance Use Topics    Alcohol use: No       Review of Systems   Constitutional: Negative for activity change, appetite change, chills, diaphoresis, fatigue and fever. Respiratory: Negative for shortness of breath. Cardiovascular: Negative for leg swelling. Gastrointestinal: Negative for diarrhea and nausea. Endocrine: Negative for cold intolerance, heat intolerance and polyuria. Musculoskeletal: Negative for arthralgias, back pain, gait problem, joint swelling and myalgias. Skin: Negative for color change, pallor, rash and wound. Allergic/Immunologic: Negative for environmental allergies and food allergies. Neurological: Negative for dizziness, weakness, light-headedness and numbness. Hematological: Does not bruise/bleed easily. Psychiatric/Behavioral: Negative for behavioral problems, confusion and self-injury. The patient is not nervous/anxious. Vitals: There were no vitals filed for this visit. General: AAO x 3 in NAD. Integument: There are no rashes, ulcers, or breaks in the skin noted to the bilateral lower extremities. There is no induration, subcutaneous nodules, or tightening of the skin noted to the bilateral.     Toenails 1-5 of the right foot do not present with thickness, elongation, discoloration, brittleness, subungual debris. Toenails 1 of the left foot do present with thickness, discoloration, brittleness, subungual debris. There appears to be remnants of the old toenail remaining distally. This nail portion was lytic and came off easily with manipulation. There is mild pain with manipulation to this portion of the left hallux toenail. No signs of bacterial infection is noted to this toe. Interdigital maceration absent to web spaces 1-4, Bilateral.     There are no preulcerative lesions noted to the right foot. There are no preulcerative lesions noted to the left foot. The skin to the bilateral feet is not thin and shiny.      The skin to the bilateral feet is  warm, supple, and dry. Vascular: DP pulse of the right foot is  palpable. DP pulse of the left foot is  palpable. PT pulse of the right foot is  palpable. PT pulse of the left foot is  palpable. CFT is less than 3 secs to the digits of the right foot. CFT is less than 3 secs to the digits of the left foot. There is no edema noted to the bilateral foot or ankle. There is hair growth noted to the digits of the bilateral feet. There are no varicosities noted to the right foot/ankle. There are no varicosities noted to the left foot/ankle. Erythema is absent to the bilateral feet. Neurological: Reflexes are present to the right plantar foot and to the Achilles tendon. Reflexes are present to the left plantar foot and to the Achilles tendon. Epicritic sensation is  intact to the right foot. Epicritic sensation is  intact to the left foot. Musculoskeletal:  Muscle strength is +5/5 to all four muscle groups of the right lower extremity and +5/5 to all four muscle groups of the left lower extremity. There are no areas of subluxation, dislocation, or laxity noted to either lower extremity. Range of motion to the right ankle is  free of pain or grinding. Range of motion to the left ankle is  free of pain or grinding. Range of motion to the right subtalar joint is  free of pain or grinding. Range of motion to the left subtalar joint is  free of pain or grinding. No abnormalities, asymmetries, or misalignments are seen between the extremities. Weightbearing evaluation does not reveal rearfoot eversion, medial prominence of the talar head, loss of the medial longitudinal arch height, and too many toes sign bilaterally. The digits of the right foot are not contracted. The digits of the left foot are not contracted. There is no prominence noted to the first metatarsal head without abduction of the hallux of the right foot. There is no prominence noted to the first metatarsal head without abduction of the hallux of the left foot. Shoe examination was performed. Biomechanical Exam: normal bilaterally. Asessment: Patient is a 37 y.o. female with:    Diagnosis Orders   1. Onychomycosis of toenail     2. Pain in toe of left foot         Plan:  1. Clinical evaluation of the patient. 2. Patient informed that she does not have an ingrown nail. Patient informed that her new nail should grow out uneventfully, but if it becomes painful, then she should return for evaluation. 3. Contact office with any questions/problems/concerns. Return if symptoms worsen or fail to improve.    6/17/2019      Nicol Ortiz DPM

## 2019-08-07 DIAGNOSIS — J32.9 CHRONIC RECURRENT SINUSITIS: ICD-10-CM

## 2019-08-07 RX ORDER — FLUTICASONE PROPIONATE 50 MCG
SPRAY, SUSPENSION (ML) NASAL
Qty: 1 BOTTLE | Refills: 1 | Status: SHIPPED | OUTPATIENT
Start: 2019-08-07 | End: 2019-10-11 | Stop reason: SDUPTHER

## 2019-08-18 DIAGNOSIS — R10.13 DYSPEPSIA: ICD-10-CM

## 2019-08-19 RX ORDER — PANTOPRAZOLE SODIUM 40 MG/1
TABLET, DELAYED RELEASE ORAL
Qty: 30 TABLET | Refills: 3 | Status: SHIPPED | OUTPATIENT
Start: 2019-08-19

## 2019-09-23 ENCOUNTER — OFFICE VISIT (OUTPATIENT)
Dept: PRIMARY CARE CLINIC | Age: 34
End: 2019-09-23
Payer: MEDICAID

## 2019-09-23 ENCOUNTER — HOSPITAL ENCOUNTER (OUTPATIENT)
Age: 34
Discharge: HOME | End: 2019-09-23
Payer: MEDICAID

## 2019-09-23 VITALS
SYSTOLIC BLOOD PRESSURE: 98 MMHG | OXYGEN SATURATION: 99 % | BODY MASS INDEX: 27.1 KG/M2 | RESPIRATION RATE: 15 BRPM | WEIGHT: 153 LBS | HEART RATE: 90 BPM | DIASTOLIC BLOOD PRESSURE: 60 MMHG

## 2019-09-23 DIAGNOSIS — K21.9 GASTROESOPHAGEAL REFLUX DISEASE, ESOPHAGITIS PRESENCE NOT SPECIFIED: ICD-10-CM

## 2019-09-23 DIAGNOSIS — E06.3 HYPOTHYROIDISM DUE TO HASHIMOTO'S THYROIDITIS: Primary | ICD-10-CM

## 2019-09-23 DIAGNOSIS — Z31.69 INFERTILITY COUNSELING: ICD-10-CM

## 2019-09-23 DIAGNOSIS — Z23 NEED FOR PNEUMOCOCCAL VACCINATION: ICD-10-CM

## 2019-09-23 DIAGNOSIS — E03.8 SUBCLINICAL HYPOTHYROIDISM: ICD-10-CM

## 2019-09-23 DIAGNOSIS — E03.8 HYPOTHYROIDISM DUE TO HASHIMOTO'S THYROIDITIS: Primary | ICD-10-CM

## 2019-09-23 LAB — TSH SERPL DL<=0.05 MIU/L-ACNC: 2.88 MIU/L (ref 0.3–5)

## 2019-09-23 PROCEDURE — 1036F TOBACCO NON-USER: CPT | Performed by: INTERNAL MEDICINE

## 2019-09-23 PROCEDURE — 90732 PPSV23 VACC 2 YRS+ SUBQ/IM: CPT | Performed by: INTERNAL MEDICINE

## 2019-09-23 PROCEDURE — 84443 ASSAY THYROID STIM HORMONE: CPT

## 2019-09-23 PROCEDURE — 99214 OFFICE O/P EST MOD 30 MIN: CPT | Performed by: INTERNAL MEDICINE

## 2019-09-23 PROCEDURE — G8419 CALC BMI OUT NRM PARAM NOF/U: HCPCS | Performed by: INTERNAL MEDICINE

## 2019-09-23 PROCEDURE — 36415 COLL VENOUS BLD VENIPUNCTURE: CPT

## 2019-09-23 PROCEDURE — 90471 IMMUNIZATION ADMIN: CPT | Performed by: INTERNAL MEDICINE

## 2019-09-23 PROCEDURE — G8427 DOCREV CUR MEDS BY ELIG CLIN: HCPCS | Performed by: INTERNAL MEDICINE

## 2019-09-24 DIAGNOSIS — E03.8 SUBCLINICAL HYPOTHYROIDISM: ICD-10-CM

## 2019-09-24 RX ORDER — LEVOTHYROXINE SODIUM 0.05 MG/1
TABLET ORAL
Qty: 60 TABLET | Refills: 1 | Status: SHIPPED | OUTPATIENT
Start: 2019-09-24 | End: 2020-02-10

## 2019-09-29 ASSESSMENT — ENCOUNTER SYMPTOMS
NAUSEA: 0
COUGH: 0
ABDOMINAL DISTENTION: 0
CONSTIPATION: 0
BACK PAIN: 0
WHEEZING: 0
VOMITING: 0
DIARRHEA: 0
SINUS PRESSURE: 0
SINUS PAIN: 0
SHORTNESS OF BREATH: 0
ABDOMINAL PAIN: 0

## 2019-09-30 NOTE — PROGRESS NOTES
due to Hashimoto's thyroiditis     2. Gastroesophageal reflux disease, esophagitis presence not specified     3. Infertility counseling  [address] DO, Gynecology, [place]   4. Need for pneumococcal vaccination  PNEUMOVAX 23 subcutaneous/IM (Pneumococcal polysaccharide vaccine 23-valent >= 3yo)           Plan:      Return in about 3 months (around 12/23/2019) for Routine follow-up. Orders Placed This Encounter   Procedures    PNEUMOVAX 23 subcutaneous/IM (Pneumococcal polysaccharide vaccine 23-valent >= 3yo)   [address] [facility] DO [place], Gynecology, [place]     Referral Priority:   Routine     Referral Type:   Eval and Treat     Referral Reason:   Specialty Services Required     Referred to Provider:   Marlen Sorto DO     Requested Specialty:   Obstetrics & Gynecology     Number of Visits Requested:   1     No orders of the defined types were placed in this encounter. Patient given educational materials - see patient instructions. Discussed use, benefit, and side effects of prescribedmedications. All patient questions answered. Pt voiced understanding. Reviewed health maintenance. Instructed to continue current medications, diet and exercise. Patient agreed with treatment plan. Follow up as directed. I spent a total of 25 minutes face to face with this patient. Over 50% of that time was spent on counseling and care coordination. Please see assessment and plan for details. Electronically signed by Bina Martinez MD on 9/29/2019 at 10:06 PM      Please note that this chart was generated using voice recognition Dragon dictation software. Although every effort was made to ensure the accuracy of this automatedtranscription, some errors in transcription may have occurred.

## 2019-10-11 DIAGNOSIS — E03.8 SUBCLINICAL HYPOTHYROIDISM: ICD-10-CM

## 2019-10-11 DIAGNOSIS — J32.9 CHRONIC RECURRENT SINUSITIS: ICD-10-CM

## 2019-10-11 RX ORDER — THYROID,PORK 15 MG
TABLET ORAL
Qty: 30 TABLET | Refills: 3 | Status: SHIPPED | OUTPATIENT
Start: 2019-10-11 | End: 2021-02-20 | Stop reason: ALTCHOICE

## 2019-10-11 RX ORDER — FLUTICASONE PROPIONATE 50 MCG
SPRAY, SUSPENSION (ML) NASAL
Qty: 2 BOTTLE | Refills: 1 | Status: SHIPPED | OUTPATIENT
Start: 2019-10-11 | End: 2021-02-20 | Stop reason: ALTCHOICE

## 2020-02-09 NOTE — TELEPHONE ENCOUNTER
Last OV 9/23/2019  Health Maintenance   Topic Date Due    Varicella vaccine (1 of 2 - 2-dose childhood series) 01/29/1984    Flu vaccine (1) 09/23/2020 (Originally 9/1/2019)    TSH testing  09/23/2020    Cervical cancer screen  10/22/2021    DTaP/Tdap/Td vaccine (2 - Td) 10/22/2028    Shingles Vaccine (1 of 2) 01/29/2033    HIV screen  Completed    Hepatitis A vaccine  Aged Out    Hepatitis B vaccine  Aged Out    Hib vaccine  Aged Out    Meningococcal (ACWY) vaccine  Aged Out    Pneumococcal 0-64 years Vaccine  Aged Out             (applicable per patient's age: Cancer Screenings, Depression Screening, Fall Risk Screening, Immunizations)    LDL Cholesterol (mg/dL)   Date Value   10/09/2018 74     BUN (mg/dL)   Date Value   05/30/2018 10      (goal A1C is < 7)   (goal LDL is <100) need 30-50% reduction from baseline     BP Readings from Last 3 Encounters:   09/23/19 98/60   06/12/19 98/64   05/29/19 92/74    (goal /80)      All Future Testing planned in [facility]:  Lab Frequency Next Occurrence   T4, Free Once 04/17/2020   TSH without Reflex Once 09/23/2020       Next Visit Date:  No future appointments.          Patient Active Problem List:     Fatigue     Acute midline thoracic back pain     Family history of stroke     Quit smoking within past year     Stress incontinence, female     Hypothyroidism due to Hashimoto's thyroiditis

## 2020-02-10 RX ORDER — LEVOTHYROXINE SODIUM 0.05 MG/1
TABLET ORAL
Qty: 30 TABLET | Refills: 3 | Status: SHIPPED | OUTPATIENT
Start: 2020-02-10

## 2021-02-20 ENCOUNTER — APPOINTMENT (OUTPATIENT)
Dept: ULTRASOUND IMAGING | Age: 36
End: 2021-02-20

## 2021-02-20 ENCOUNTER — HOSPITAL ENCOUNTER (EMERGENCY)
Age: 36
Discharge: HOME | End: 2021-02-20
Attending: EMERGENCY MEDICINE

## 2021-02-20 VITALS
DIASTOLIC BLOOD PRESSURE: 65 MMHG | RESPIRATION RATE: 18 BRPM | HEIGHT: 63 IN | WEIGHT: 160 LBS | BODY MASS INDEX: 28.35 KG/M2 | HEART RATE: 76 BPM | TEMPERATURE: 98.3 F | SYSTOLIC BLOOD PRESSURE: 102 MMHG | OXYGEN SATURATION: 98 %

## 2021-02-20 DIAGNOSIS — O20.0 THREATENED MISCARRIAGE IN EARLY PREGNANCY: Primary | ICD-10-CM

## 2021-02-20 LAB
-: ABNORMAL
AMORPHOUS: ABNORMAL
BACTERIA: ABNORMAL
BILIRUBIN URINE: NEGATIVE
CASTS UA: ABNORMAL /LPF
COLOR: YELLOW
COMMENT UA: ABNORMAL
CRYSTALS, UA: ABNORMAL /HPF
EPITHELIAL CELLS UA: ABNORMAL /HPF (ref 0–5)
GLUCOSE URINE: NEGATIVE
HCG QUANTITATIVE: 4488 IU/L
KETONES, URINE: NEGATIVE
LEUKOCYTE ESTERASE, URINE: ABNORMAL
MUCUS: ABNORMAL
NITRITE, URINE: NEGATIVE
OTHER OBSERVATIONS UA: ABNORMAL
PH UA: 5 (ref 5–8)
PROTEIN UA: NEGATIVE
RBC UA: ABNORMAL /HPF (ref 0–2)
RENAL EPITHELIAL, UA: ABNORMAL /HPF
SPECIFIC GRAVITY UA: 1.01 (ref 1–1.03)
TRICHOMONAS: ABNORMAL
TURBIDITY: CLEAR
URINE HGB: ABNORMAL
UROBILINOGEN, URINE: NORMAL
WBC UA: ABNORMAL /HPF (ref 0–5)
YEAST: ABNORMAL

## 2021-02-20 PROCEDURE — 76801 OB US < 14 WKS SINGLE FETUS: CPT

## 2021-02-20 PROCEDURE — 76817 TRANSVAGINAL US OBSTETRIC: CPT

## 2021-02-20 PROCEDURE — 84702 CHORIONIC GONADOTROPIN TEST: CPT

## 2021-02-20 PROCEDURE — 99284 EMERGENCY DEPT VISIT MOD MDM: CPT

## 2021-02-20 PROCEDURE — 81001 URINALYSIS AUTO W/SCOPE: CPT

## 2021-02-20 PROCEDURE — 36415 COLL VENOUS BLD VENIPUNCTURE: CPT

## 2021-02-20 RX ORDER — PROGESTERONE 200 MG/1
200 CAPSULE ORAL DAILY
COMMUNITY

## 2021-02-20 ASSESSMENT — PAIN DESCRIPTION - LOCATION: LOCATION: ABDOMEN;GROIN

## 2021-02-20 ASSESSMENT — ENCOUNTER SYMPTOMS
SORE THROAT: 0
VOMITING: 0
SHORTNESS OF BREATH: 0
DIARRHEA: 0

## 2021-02-20 ASSESSMENT — PAIN DESCRIPTION - ONSET: ONSET: SUDDEN

## 2021-02-20 ASSESSMENT — PAIN SCALES - GENERAL: PAINLEVEL_OUTOF10: 3

## 2021-02-20 ASSESSMENT — PAIN DESCRIPTION - PAIN TYPE: TYPE: ACUTE PAIN

## 2021-02-20 NOTE — ED PROVIDER NOTES
[id number] [facility] ED  [address] [facility] [place] [address]  Phone: [phone]  Fax: [phone] [facility] ED  Jackie      Pt Name: Rosa Maria Peoples  MRN: [id number]  [place] 1985  Date of evaluation: 2/20/2021  Provider: Charlotte Ellis, [address] [place] [address]       Chief Complaint   Patient presents with    Vaginal Bleeding     approx 6 weeks pregnant with a beta leslee at 2/18 with value 2826 and started with vaginal bleeding and groin pain 2/19/21         HISTORY OF PRESENT ILLNESS   (Location/Symptom, Timing/Onset,Context/Setting, Quality, Duration, Modifying Factors, Severity)  Note limiting factors. Rosa Maria Peoples is a 43 y.o. female who presents to the emergency department for the evaluation of vaginal bleeding. Patient is currently in a fertility center, she is approximately 6 weeks pregnant and she was seeing her fertility doctor on Thursday and had a quantitative hCG of 2826. She began having some groin cramping, on both sides, left greater than right and she has had some vaginal bleeding best described as spotting over the last couple of days. She has a history of 1 previous pregnancy, that child is 16years old. She has no abdominal pain, no vomiting or diarrhea. She has no other acute complaints patient is known to be a positive. Nursing Notes were reviewed. REVIEW OF SYSTEMS    (2-9systems for level 4, 10 or more for level 5)     Review of Systems   Constitutional: Negative for fever. HENT: Negative for sore throat. Respiratory: Negative for shortness of breath. Cardiovascular: Negative for chest pain. Gastrointestinal: Negative for diarrhea and vomiting. Genitourinary: Positive for vaginal bleeding and vaginal pain. Negative for dysuria. Skin: Negative for rash. Neurological: Negative for weakness. All other systems reviewed and are negative.       Except asnoted above the remainder of the review of systems was reviewed and negative. PAST MEDICAL HISTORY     Past Medical History:   Diagnosis Date    Depression     Thyroid disease          SURGICAL HISTORY     History reviewed. No pertinent surgical history. CURRENT MEDICATIONS     Previous Medications    IBUPROFEN (IBU) 400 MG TABLET    Take 1 tablet by mouth every 6 hours as needed for Pain    LEVOTHYROXINE (SYNTHROID) 50 MCG TABLET    TAKE 1 TABLET BY MOUTH EVERY DAY    PANTOPRAZOLE (PROTONIX) 40 MG TABLET    TAKE 1 TABLET BY MOUTH EVERY DAY BEFORE BREAKFAST    PROMETRIUM 200 MG CAPSULE    Take 200 mg by mouth daily Inserted vaginally       ALLERGIES     Seasonal    FAMILY HISTORY     History reviewed. No pertinent family history.        SOCIAL HISTORY       Social History     Socioeconomic History    Marital status:      Spouse name: None    Number of children: None    Years of education: None    Highest education level: None   Occupational History    None   Social Needs    Financial resource strain: None    Food insecurity     Worry: None     Inability: None    Transportation needs     Medical: None     Non-medical: None   Tobacco Use    Smoking status: Former Smoker    Smokeless tobacco: Never Used   Substance and Sexual Activity    Alcohol use: No    Drug use: No    Sexual activity: None   Lifestyle    Physical activity     Days per week: None     Minutes per session: None    Stress: None   Relationships    Social connections     Talks on phone: None     Gets together: None     Attends Mandaen service: None     Active member of club or organization: None     Attends meetings of clubs or organizations: None     Relationship status: None    Intimate partner violence     Fear of current or ex partner: None     Emotionally abused: None     Physically abused: None     Forced sexual activity: None   Other Topics Concern    None   Social History Narrative    None       SCREENINGS    David Coma Scale  Eye Opening: Spontaneous  Best Verbal Response: Oriented  Best Motor Response: Obeys commands  David Coma Scale Score: 15        PHYSICAL EXAM    (up to 7 for level 4, 8 or more for level 5)     ED Triage Vitals [02/20/21 1140]   BP Temp Temp Source Pulse Resp SpO2 Height Weight   102/65 98.3 °F (36.8 °C) Oral 76 18 98 % 5' 3\" (1.6 m) 160 lb (72.6 kg)       Physical Exam  Vitals signs and nursing note reviewed. Constitutional:       General: She is not in acute distress. Appearance: Normal appearance. She is not ill-appearing or toxic-appearing. HENT:      Head: Normocephalic and atraumatic. Nose: Nose normal. No congestion. Mouth/Throat:      Mouth: Mucous membranes are moist.   Eyes:      General:         Right eye: No discharge. Left eye: No discharge. Conjunctiva/sclera: Conjunctivae normal.   Neck:      Musculoskeletal: Normal range of motion. Cardiovascular:      Rate and Rhythm: Normal rate and regular rhythm. Pulses: Normal pulses. Heart sounds: Normal heart sounds. No murmur. Pulmonary:      Effort: Pulmonary effort is normal. No respiratory distress. Breath sounds: Normal breath sounds. No wheezing. Abdominal:      General: Abdomen is flat. There is no distension. Palpations: Abdomen is soft. Tenderness: There is no abdominal tenderness. Genitourinary:     Comments: No focal tenderness or mass appreciated in the pelvic region on either side  Musculoskeletal: Normal range of motion. General: No deformity or signs of injury. Skin:     General: Skin is warm and dry. Capillary Refill: Capillary refill takes less than 2 seconds. Findings: No rash. Neurological:      General: No focal deficit present. Mental Status: She is alert. Mental status is at baseline. Motor: No weakness. Comments: Speaking normally. No facial asymmetry. Moving all 4 extremities. Normal gait.     Psychiatric:         Mood and Affect: Mood normal.         EMERGENCY DEPARTMENT COURSE and DIFFERENTIAL DIAGNOSIS/MDM:   Vitals:    Vitals:    02/20/21 1140   BP: 102/65   Pulse: 76   Resp: 18   Temp: 98.3 °F (36.8 °C)   TempSrc: Oral   SpO2: 98%   Weight: 72.6 kg (160 lb)   Height: 5' 3\" (1.6 m)       Patient presents to the emergency department with a complaint described above. Vitals are grossly normal and the patient is nontoxic. Physical examination does not reveal any pelvic masses or point tenderness. Her vitals are stable and I have very low suspicion for large ectopic pregnancy or ruptured ectopic pregnancy, however, I am going to get a quantitative hCG to compare to previous 2 days ago and I am going to get an ultrasound as she has not yet had one this pregnancy. I am going to hold off on a pelvic exam and I will reevaluate      DIAGNOSTIC RESULTS     LABS:  Labs Reviewed   URINE RT REFLEX TO CULTURE - Abnormal; Notable for the following components:       Result Value    Urine Hgb MODERATE (*)     Leukocyte Esterase, Urine SMALL (*)     All other components within normal limits   HCG, QUANTITATIVE, PREGNANCY - Abnormal; Notable for the following components:    hCG Quant 4,488 (*)     All other components within normal limits   MICROSCOPIC URINALYSIS - Abnormal; Notable for the following components:    Bacteria, UA MODERATE (*)     Other Observations UA   (*)     Value: Utilizing a urinalysis as the only screening method to exclude a potential uropathogen can be unreliable in many patient populations. Rapid screening tests are less sensitive than culture and if UTI is a clinical possibility, culture should be considered despite a negative urinalysis. All other components within normal limits       All other labs were within normal range or not returned as of this dictation. RADIOLOGY:  US OB LESS THAN 14 WEEKS SINGLE OR FIRST GESTATION   Final Result   Intrauterine gestational sac without evidence of fetal pole or yolk sac.    Findings suggest possible early IUP with fetal pole/yolk sac being too early   to visualize. Recommend correlation with beta HCG levels and short-term   follow-up ultrasound to confirm fetal viability. US OB TRANSVAGINAL   Final Result   Intrauterine gestational sac without evidence of fetal pole or yolk sac. Findings suggest possible early IUP with fetal pole/yolk sac being too early   to visualize. Recommend correlation with beta HCG levels and short-term   follow-up ultrasound to confirm fetal viability. ED Course as of Feb 20 1341   Sat Feb 20, 2021   1244 Want to take of hCG has almost doubled, it is now 12 this is a little bit lower than what I would expect to see from Thursday, should be a little closer to 8000 but we will see what ultrasound shows and reevaluate    [TS]   1339 Ultrasound shows gestational sac without yolk sac or fetal pole and quantitative hCG is just a little bit below what I would expect as I previously discussed. I discussed all these results with the patient, I told her it does not appear that she is having an active miscarriage, however, I cannot rule out the possibility of her having a miscarriage. I also told her that I have low suspicion she has an ectopic pregnancy but this also cannot be completely ruled out. She has an order to get her quantitative hCG repeated on Monday and she has an order for an ultrasound on the 25th and she is following up with her fertility doctor who is going to be sending her to an OB/GYN as well. Patient understands what to return to ER for and understands the importance of follow-up    At this time the patient is without objective evidence of an acute process requiring hospitalization or inpatient management. They have remained hemodynamically stable and are stable for discharge with outpatient follow-up. Standard anticipatory guidance given to patient upon discharge. Have given them a specific time frame in which to follow-up and who to follow-up with.   I have also advised them that they should return to the emergency department if they get worse, or not getting better or develop any new or concerning symptoms. Patient demonstrates understanding.    [TS]      ED Course User Index  [TS] Jeff Velez DO         PROCEDURES:  Unless otherwise noted below, none     Procedures    FINAL IMPRESSION      1.  Threatened miscarriage in early pregnancy          DISPOSITION/PLAN   DISPOSITION Decision To Discharge 02/20/2021 01:41:08 PM      PATIENT REFERRED TO:  Your lab draw and [address] [facility]  as scheduled          DISCHARGE MEDICATIONS:  New Prescriptions    No medications on file          (Please note that portions of this note were completed with a voice recognition program.  Efforts were made to edit the dictations but occasionally words are mis-transcribed.)    Jeff Velez DO (electronically signed)  Board Certified Emergency Physician          Jeff Velez DO  02/20/21 3023

## 2021-02-20 NOTE — ED NOTES
Dr Prashant Presley at bedside to update with results and plan of care for discharge     Bud Rick RN  02/20/21 9027